# Patient Record
Sex: FEMALE | Race: WHITE | NOT HISPANIC OR LATINO | Employment: FULL TIME | URBAN - METROPOLITAN AREA
[De-identification: names, ages, dates, MRNs, and addresses within clinical notes are randomized per-mention and may not be internally consistent; named-entity substitution may affect disease eponyms.]

---

## 2017-03-02 ENCOUNTER — TELEPHONE (OUTPATIENT)
Dept: NEUROSURGERY | Facility: CLINIC | Age: 52
End: 2017-03-02

## 2017-03-17 ENCOUNTER — TELEPHONE (OUTPATIENT)
Dept: NEUROSURGERY | Facility: CLINIC | Age: 52
End: 2017-03-17

## 2017-03-21 ENCOUNTER — OFFICE VISIT (OUTPATIENT)
Dept: NEUROSURGERY | Facility: CLINIC | Age: 52
End: 2017-03-21
Payer: COMMERCIAL

## 2017-03-21 ENCOUNTER — TELEPHONE (OUTPATIENT)
Dept: NEUROSURGERY | Facility: CLINIC | Age: 52
End: 2017-03-21

## 2017-03-21 ENCOUNTER — LAB VISIT (OUTPATIENT)
Dept: LAB | Facility: HOSPITAL | Age: 52
End: 2017-03-21
Attending: NEUROLOGICAL SURGERY
Payer: COMMERCIAL

## 2017-03-21 VITALS
HEIGHT: 63 IN | WEIGHT: 158 LBS | HEART RATE: 105 BPM | BODY MASS INDEX: 28 KG/M2 | DIASTOLIC BLOOD PRESSURE: 91 MMHG | SYSTOLIC BLOOD PRESSURE: 150 MMHG

## 2017-03-21 DIAGNOSIS — G93.89 BRAIN MASS: Primary | ICD-10-CM

## 2017-03-21 DIAGNOSIS — G93.89 BRAIN MASS: ICD-10-CM

## 2017-03-21 DIAGNOSIS — D32.9 MENINGIOMA: Primary | ICD-10-CM

## 2017-03-21 LAB
ALBUMIN SERPL BCP-MCNC: 3.7 G/DL
ALP SERPL-CCNC: 86 U/L
ALT SERPL W/O P-5'-P-CCNC: 11 U/L
ANION GAP SERPL CALC-SCNC: 9 MMOL/L
AST SERPL-CCNC: 18 U/L
BILIRUB SERPL-MCNC: 0.2 MG/DL
BUN SERPL-MCNC: 7 MG/DL
CALCIUM SERPL-MCNC: 9.2 MG/DL
CHLORIDE SERPL-SCNC: 101 MMOL/L
CO2 SERPL-SCNC: 30 MMOL/L
CORTIS SERPL-MCNC: 3.4 UG/DL
CREAT SERPL-MCNC: 0.8 MG/DL
EST. GFR  (AFRICAN AMERICAN): >60 ML/MIN/1.73 M^2
EST. GFR  (NON AFRICAN AMERICAN): >60 ML/MIN/1.73 M^2
FSH SERPL-ACNC: 6.5 MIU/ML
GLUCOSE SERPL-MCNC: 102 MG/DL
LH SERPL-ACNC: 4 MIU/ML
POTASSIUM SERPL-SCNC: 3.9 MMOL/L
PROLACTIN SERPL IA-MCNC: 9.3 NG/ML
PROT SERPL-MCNC: 6.7 G/DL
SODIUM SERPL-SCNC: 140 MMOL/L
T4 FREE SERPL-MCNC: 0.94 NG/DL
TSH SERPL DL<=0.005 MIU/L-ACNC: 0.6 UIU/ML

## 2017-03-21 PROCEDURE — 82533 TOTAL CORTISOL: CPT

## 2017-03-21 PROCEDURE — 83002 ASSAY OF GONADOTROPIN (LH): CPT

## 2017-03-21 PROCEDURE — 99204 OFFICE O/P NEW MOD 45 MIN: CPT | Mod: S$GLB,,, | Performed by: NEUROLOGICAL SURGERY

## 2017-03-21 PROCEDURE — 83001 ASSAY OF GONADOTROPIN (FSH): CPT

## 2017-03-21 PROCEDURE — 80053 COMPREHEN METABOLIC PANEL: CPT

## 2017-03-21 PROCEDURE — 1160F RVW MEDS BY RX/DR IN RCRD: CPT | Mod: S$GLB,,, | Performed by: NEUROLOGICAL SURGERY

## 2017-03-21 PROCEDURE — 84439 ASSAY OF FREE THYROXINE: CPT

## 2017-03-21 PROCEDURE — 99999 PR PBB SHADOW E&M-EST. PATIENT-LVL III: CPT | Mod: PBBFAC,,, | Performed by: NEUROLOGICAL SURGERY

## 2017-03-21 PROCEDURE — 84443 ASSAY THYROID STIM HORMONE: CPT

## 2017-03-21 PROCEDURE — 82672 ASSAY OF ESTROGEN: CPT

## 2017-03-21 PROCEDURE — 84146 ASSAY OF PROLACTIN: CPT

## 2017-03-21 PROCEDURE — 84305 ASSAY OF SOMATOMEDIN: CPT

## 2017-03-21 PROCEDURE — 30000890 MAYO MISCELLANEOUS TEST (REFLEX)

## 2017-03-21 PROCEDURE — 83003 ASSAY GROWTH HORMONE (HGH): CPT

## 2017-03-21 RX ORDER — PROMETHAZINE HYDROCHLORIDE 50 MG/1
50 TABLET ORAL EVERY 4 HOURS
COMMUNITY

## 2017-03-21 RX ORDER — LISDEXAMFETAMINE DIMESYLATE 70 MG/1
70 CAPSULE ORAL EVERY MORNING
COMMUNITY
End: 2021-01-12

## 2017-03-21 RX ORDER — HYDROCHLOROTHIAZIDE 25 MG/1
50 TABLET ORAL DAILY
COMMUNITY

## 2017-03-21 RX ORDER — MORPHINE SULFATE 100 MG/1
30 TABLET, FILM COATED, EXTENDED RELEASE ORAL 2 TIMES DAILY
COMMUNITY
End: 2018-02-27

## 2017-03-21 RX ORDER — ORPHENADRINE CITRATE 100 MG/1
100 TABLET, EXTENDED RELEASE ORAL 2 TIMES DAILY
COMMUNITY

## 2017-03-21 RX ORDER — TIZANIDINE 4 MG/1
4 TABLET ORAL
COMMUNITY
End: 2017-08-30 | Stop reason: SDUPTHER

## 2017-03-21 RX ORDER — IBUPROFEN 800 MG/1
800 TABLET ORAL 2 TIMES DAILY PRN
COMMUNITY
End: 2018-11-29 | Stop reason: SDUPTHER

## 2017-03-21 RX ORDER — METOPROLOL TARTRATE 25 MG/1
100 TABLET, FILM COATED ORAL 2 TIMES DAILY
COMMUNITY

## 2017-03-21 RX ORDER — BUTALBITAL, ASPIRIN, AND CAFFEINE 325; 50; 40 MG/1; MG/1; MG/1
1 CAPSULE ORAL
COMMUNITY
End: 2017-08-30 | Stop reason: SDUPTHER

## 2017-03-21 RX ORDER — QUETIAPINE FUMARATE 100 MG/1
200 TABLET, FILM COATED ORAL NIGHTLY
COMMUNITY

## 2017-03-21 NOTE — MR AVS SNAPSHOT
[facility]  [address] [address]  Phone: [phone]                  Patty Owens   3/21/2017 10:00 AM   Office Visit    Description:  Female : 1965   Provider:  Castillo Roberts MD   Department:  [facility]           Reason for Visit     Consult                To Do List           Future Appointments        Provider Department Dept Phone    3/21/2017 11:30 AM LAB, HEMONC SAME DAY [facility] [phone]    2017 9:45 AM NOM MRI4 [facility] [phone]    2017 10:45 AM Castillo Roberts MD [facility] [phone]      Goals (5 Years of Data)     None      [facility]     [facility] On [facility] Nurse Care Line -  Assistance  Registered nurses in the [facility] Center provide clinical advisement, health education, appointment booking, and other advisory services.  Call for this free service at [phone].             Medications           Message regarding Medications     Verify the changes and/or additions to your medication regime listed below are the same as discussed with your clinician today.  If any of these changes or additions are incorrect, please notify your healthcare provider.             Verify that the below list of medications is an accurate representation of the medications you are currently taking.  If none reported, the list may be blank. If incorrect, please contact your healthcare provider. Carry this list with you in case of emergency.           Current Medications     butalbital-aspirin-caffeine -40 mg (FIORINAL) -40 mg Cap Take 1 capsule by mouth.    hydrochlorothiazide (HYDRODIURIL) 25 MG tablet Take 50 mg by mouth once daily.    ibuprofen (ADVIL,MOTRIN) 800 MG tablet Take 800 mg by mouth 2 (two) times daily as needed for Pain.    lisdexamfetamine (VYVANSE) 70 MG capsule Take 70 mg by mouth every morning.    metoprolol  "tartrate (LOPRESSOR) 25 MG tablet Take 100 mg by mouth 2 (two) times daily.    morphine (MS CONTIN) 100 MG 12 hr tablet Take 30 mg by mouth 2 (two) times daily.    orphenadrine (NORFLEX) 100 mg tablet Take 100 mg by mouth 2 (two) times daily.    promethazine (PHENERGAN) 50 MG tablet Take 50 mg by mouth every 4 (four) hours.    quetiapine (SEROQUEL) 100 MG Tab Take 200 mg by mouth every evening.    tizanidine (ZANAFLEX) 4 MG tablet Take 4 mg by mouth.           Clinical Reference Information           Your Vitals Were     BP Pulse Height Weight BMI    150/91 105 5' 3" (1.6 m) 71.7 kg (158 lb) 27.99 kg/m2      Blood Pressure          Most Recent Value    BP  (!)  150/91      Allergies as of 3/21/2017     Darvocet A500 [Propoxyphene N-acetaminophen]    Tigan [Trimethobenzamide]    Darvon [Propoxyphene]    Topamax [Topiramate]      Immunizations Administered on Date of Encounter - 3/21/2017     None      [facility] Sign-Up     Activating your [facility] account is as easy as 1-2-3!     1) Visit [email or web address], select Sign Up Now, enter this activation code and your date of birth, then select Next.  [email or web address]  Expires: 5/5/2017 10:07 AM      2) Create a username and password to use when you visit [facility] in the future and select a security question in case you lose your password and select Next.    3) Enter your e-mail address and click Sign Up!    Additional Information  If you have questions, please e-mail [email or web address] or call [phone] to talk to our [facility] staff. Remember, [facility] is NOT to be used for urgent needs. For medical emergencies, dial 911.         Instructions    I have reviewed the MRI of the brain with the patient, which shows a residual/recurrent sphenoid wing meningioma on the left side covering the cavernous sinus that measures 2.13 cm x 0.8 cm. There is enhancement that is intraorbital near the lateral rectus  There is also evidence of enhancement in the area of the sphenoid " sinus, which is concerning for chronic sinusitis. I have explained the indications of sphenoid wing meningiomas and the ways they can cause worsening of her vision.     I will order pituitary labs on the patient today. I would like a copy of all of her previous imaging of the brain. I would also like a copy of her records from [place]. I will refer the patient to Dr. Hewitt of Neuro-opthalmology for a full evaluation with [facility]. I will order a repeat MRI of the brain. I will schedule her follow up in 2-3 weeks to discuss a treatment plan.       Language Assistance Services     ATTENTION: Language assistance services are available, free of charge. Please call [phone].      ATENCIÓN: Si benla lisa, tiene a xiao disposición servicios gratuitos de asistencia lingüística. Llame al [phone].     CHÚ Ý: N?u b?n nói Ti?ng Vi?t, có các d?ch v? h? tr? ngôn ng? mi?n phí dành cho b?n. G?i s? [phone].         [facility] complies with applicable Federal civil rights laws and does not discriminate on the basis of race, color, national origin, age, disability, or sex.

## 2017-03-21 NOTE — PROGRESS NOTES
"Subjective:    I, Chandni Saldivar, am scribing for, and in the presence of, Dr. Castillo Roberts.     Patient ID: Patty Owens is a 51 y.o. female.    Chief Complaint: Consult    HPI This is a 51-year-old female, referred by Dr. Baptiste, who presents today for consultation for meningioma. In 2013, she began to experience visual disturbance in her left eye. She was evaluated by an ophthalmologist at the time and the sphenoid wing meningioma was discovered on a MRI. She underwent resection of this tumor performed by Dr. Ahmadi. She states a pathology report was performed and she was told it was benign, but unsure the exact pathology. There was residual tumor at the time of resection. A month after the surgery, she started to experience headaches and was diagnosed with a CSF leak. She has been followed by two ENT physicians and Dr. Ahmadi regarding this. She had another MRI performed in 2013, which showed a second tumor near the optic nerve. In November 2013, the pt underwent Cyberknife radiosurgery by Dr. Cramer..She had normal scans in 2014. She had follow up scans in 2015 and 2016 and was told they were normal; however, the reports indicated otherwise. The pt reports significant anxiety/nervousness surrounding the tumors and the follow up.     She describes persistent, left temporal headaches with extreme sensitivity, numbness, and intermittent "shooting" pain in this region.    Intermittently when bending over she experiences postnasal drip/rhinorrhea and believes she has intermittent CSF leaks. This last happened approximately one month ago. Associated symptoms include dizziness and nausea. She has tried to collect the postnasal drip/rhinorrhea without success.     She also endorses worsening of her vision over this time period. 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 She also experiences intermittent pain in the left eye and eye drooping. She has not tried to discuss this " "with Dr. Ahmadi.    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 She reports gaining approximately 30 pounds and suffering with extreme fatigue over the last year. She has not had her pituitary labs checked. She takes Vyvanse to help focus at work and has intermittent difficulty with word finding. She denies depression and states that she is just "tired of it all."    Review of Systems   Constitutional: Positive for fatigue and unexpected weight change. Negative for activity change and fever.   HENT: Positive for postnasal drip and rhinorrhea. Negative for facial swelling.    Eyes: Positive for pain and visual disturbance.   Respiratory: Negative.    Cardiovascular: Negative.    Gastrointestinal: Positive for nausea. Negative for diarrhea and vomiting.   Genitourinary: Negative.    Musculoskeletal: Negative for back pain, joint swelling and myalgias.   Neurological: Positive for dizziness, speech difficulty and headaches. Negative for seizures, weakness and numbness.   Psychiatric/Behavioral: The patient is nervous/anxious.        Past Medical History:   Diagnosis Date    Allergy     Anxiety     Cataract     Fibromyalgia     Hypertension     Meningioma     Migraines      Objective:     BP (!) 150/91  Pulse 105  Ht 5' 3" (1.6 m)  Wt 71.7 kg (158 lb)  BMI 27.99 kg/m2  Physical Exam   Constitutional: She is oriented to person, place, and time. She appears well-developed and well-nourished.   HENT:   Head: Normocephalic and atraumatic.   Eyes: Pupils are equal, round, and reactive to light.   Inferior field cut and bitemporal field cut   Neck: Neck supple.   Pulmonary/Chest: Effort normal.   Musculoskeletal: She exhibits no edema.   Neurological: She is alert and oriented to person, place, and time. No cranial nerve deficit. She displays a negative Romberg sign. GCS eye subscore is 4. GCS verbal subscore is 5. GCS motor subscore is 6.   Skin: Skin is warm, dry and intact. "   Psychiatric: She has a normal mood and affect.         Imaging:  MRI of the brain, dated 10/31/2016 (outside facility), shows a residual/recurrent sphenoid wing meningioma on the left side covering the cavernous sinus that measures 2.13 cm x 0.8 cm. There is enhancement that is intraorbital near the lateral rectus  There is also evidence of enhancement in the area of the sphenoid sinus, which is concerning for chronic sinusitis.       I have personally reviewed the images with the pt.      I, Dr. Castillo Roberts, personally performed the services described in this documentation as scribed by Chandni Saldivar in my presence, and it is both accurate and complete.  Assessment:       Meningioma.    Plan:   I have reviewed the MRI of the brain with the patient, which shows a residual/recurrent sphenoid wing meningioma on the left side covering the cavernous sinus that measures 2.13 cm x 0.8 cm. There is enhancement that is intraorbital near the lateral rectus  There is also evidence of enhancement in the area of the sphenoid sinus, which is concerning for chronic sinusitis. I have explained the indications of sphenoid wing meningiomas and the ways they can cause worsening of her vision.     I will order pituitary labs on the patient today. I would like a copy of all of her previous imaging of her brain. I would also like a copy of her records from [place]. I will refer the patient to Dr. Hewitt of Neuro-opthalmology for a full evaluation with [facility]. I will order a repeat MRI of the brain. I will schedule her follow up in 2-3 weeks to discuss a treatment plan.

## 2017-03-21 NOTE — PATIENT INSTRUCTIONS
I have reviewed the MRI of the brain with the patient, which shows a residual/recurrent sphenoid wing meningioma on the left side covering the cavernous sinus that measures 2.13 cm x 0.8 cm. There is enhancement that is intraorbital near the lateral rectus  There is also evidence of enhancement in the area of the sphenoid sinus, which is concerning for chronic sinusitis. I have explained the indications of sphenoid wing meningiomas and the ways they can cause worsening of her vision.     I will order pituitary labs on the patient today. I would like a copy of all of her previous imaging of the brain. I would also like a copy of her records from [place]. I will refer the patient to Dr. Hewitt of Neuro-opthalmology for a full evaluation with [facility]. I will order a repeat MRI of the brain. I will schedule her follow up in 2-3 weeks to discuss a treatment plan.

## 2017-03-21 NOTE — LETTER
March 22, 2017      eKra Baptiste MD  [address]  [facility] [address]           [facility]  [address]  [place] [address]  Phone: [phone]          Patient: Patty Owens   MR Number: [id number]   YOB: 1965   Date of Visit: 3/21/2017       Dear Dr. Kera Baptiste:    Thank you for referring Patty Owens to me for evaluation. Attached you will find relevant portions of my assessment and plan of care.    If you have questions, please do not hesitate to call me. I look forward to following Patty Owens along with you.    Sincerely,    Castillo Roberts MD    Enclosure  CC:  No Recipients    If you would like to receive this communication electronically, please contact [email or web address] or [phone] to request more information on [employer] access.    For providers and/or their staff who would like to refer a patient to [facility], please contact us through our one-stop-shop provider referral line, [facility] , at [phone].    If you feel you have received this communication in error or would no longer like to receive these types of communications, please e-mail [email or web address]

## 2017-03-23 LAB — ESTROGEN SERPL-MCNC: 381 PG/ML

## 2017-03-24 LAB — GH SERPL-MCNC: <0.1 NG/ML

## 2017-03-25 LAB — MAYO MISCELLANEOUS RESULT (REF): NORMAL

## 2017-03-28 ENCOUNTER — CLINICAL SUPPORT (OUTPATIENT)
Dept: OPHTHALMOLOGY | Facility: CLINIC | Age: 52
End: 2017-03-28
Payer: COMMERCIAL

## 2017-03-28 ENCOUNTER — INITIAL CONSULT (OUTPATIENT)
Dept: OPHTHALMOLOGY | Facility: CLINIC | Age: 52
End: 2017-03-28
Payer: COMMERCIAL

## 2017-03-28 DIAGNOSIS — H53.413 CENTRAL SCOTOMA, BOTH EYES: ICD-10-CM

## 2017-03-28 DIAGNOSIS — D32.0 INTRACRANIAL MENINGIOMA: ICD-10-CM

## 2017-03-28 DIAGNOSIS — H47.292: ICD-10-CM

## 2017-03-28 PROCEDURE — 92083 EXTENDED VISUAL FIELD XM: CPT | Mod: S$GLB,,, | Performed by: OPHTHALMOLOGY

## 2017-03-28 PROCEDURE — 92004 COMPRE OPH EXAM NEW PT 1/>: CPT | Mod: S$GLB,,, | Performed by: OPHTHALMOLOGY

## 2017-03-28 PROCEDURE — 99999 PR PBB SHADOW E&M-EST. PATIENT-LVL II: CPT | Mod: PBBFAC,,, | Performed by: OPHTHALMOLOGY

## 2017-03-28 PROCEDURE — 92133 CPTRZD OPH DX IMG PST SGM ON: CPT | Mod: S$GLB,,, | Performed by: OPHTHALMOLOGY

## 2017-03-28 NOTE — PROGRESS NOTES
HPI     Concerns About Ocular Health    Additional comments:  Meningioma            Comments   Referred by  Evaluation  Dx w/Meningioma x 04/2013(removed). Second 2013(radiation), and another   last year 2016.  Pt states OU see floaters and partial of her vision gets blurry. OU   feeling of pressure and focusing.  Headaches all the times mainly the left side head(shooting and stabbing).   7 on pain scale.  Night driving and peripheral vision getting difficult.  Review HVF    I have personally interviewed the patient, reviewed the history and   examined the patient and agree with the technician's exam.           Last edited by Marty Hewitt MD on 3/28/2017  9:19 AM. (History)        ROS     Positive for: Neurological, Musculoskeletal, Cardiovascular, Eyes    Negative for: Constitutional, Gastrointestinal, Skin, Genitourinary,   HENT, Endocrine, Respiratory, Psychiatric, Allergic/Imm, Heme/Lymph    Last edited by Marty Hewitt MD on 3/28/2017  9:19 AM. (History)        Assessment /Plan     For exam results, see Encounter Report.    Intracranial meningioma  -     [facility] Visual Field - OU - Extended - Both Eyes  -     Posterior Segment OCT Optic Nerve- Both eyes    Central scotoma, both eyes  -     [facility] Visual Field - OU - Extended - Both Eyes  -     Posterior Segment OCT Optic Nerve- Both eyes    Secondary optic atrophy of left eye      Ms. Owens's left optic nerve demonstrated loss of nerve tissue temporally that could be the result either of direct compression from her meningioma or to radiation necrosis. Her right optic nerve appears healthy. I will repeat her exam and visual fields in six months.

## 2017-03-28 NOTE — MR AVS SNAPSHOT
[facility]  [address]  [facility] [address]  Phone: [phone]  Fax: [phone]                  Patty Owens   3/28/2017 9:00 AM   Initial consult    Description:  Female : 1965   Provider:  Marty Hewitt MD   Department:  [facility]           Reason for Visit     Concerns About Ocular Health           Diagnoses this Visit        Comments    Intracranial meningioma         Central scotoma, both eyes         Secondary optic atrophy of left eye                To Do List           Future Appointments        Provider Department Dept Phone    2017 9:45 AM [facility] MRI4 [facility] [phone]    2017 10:45 AM Castillo Roberts MD [facility] [place] [phone]      Goals (5 Years of Data)     None      Follow-Up and Disposition     Return in about 6 months (around 2017) for Exam and HVF.      [facility]     [facility] Nurse Care Line -  Assistance  Registered nurses in the [facility] provide clinical advisement, health education, appointment booking, and other advisory services.  Call for this free service at [phone].             Medications           Message regarding Medications     Verify the changes and/or additions to your medication regime listed below are the same as discussed with your clinician today.  If any of these changes or additions are incorrect, please notify your healthcare provider.             Verify that the below list of medications is an accurate representation of the medications you are currently taking.  If none reported, the list may be blank. If incorrect, please contact your healthcare provider. Carry this list with you in case of emergency.           Current Medications     butalbital-aspirin-caffeine -40 mg (FIORINAL) -40 mg Cap Take 1 capsule by mouth.    hydrochlorothiazide (HYDRODIURIL) 25 MG tablet Take 50 mg by mouth once daily.     ibuprofen (ADVIL,MOTRIN) 800 MG tablet Take 800 mg by mouth 2 (two) times daily as needed for Pain.    lisdexamfetamine (VYVANSE) 70 MG capsule Take 70 mg by mouth every morning.    metoprolol tartrate (LOPRESSOR) 25 MG tablet Take 100 mg by mouth 2 (two) times daily.    morphine (MS CONTIN) 100 MG 12 hr tablet Take 30 mg by mouth 2 (two) times daily.    orphenadrine (NORFLEX) 100 mg tablet Take 100 mg by mouth 2 (two) times daily.    promethazine (PHENERGAN) 50 MG tablet Take 50 mg by mouth every 4 (four) hours.    quetiapine (SEROQUEL) 100 MG Tab Take 200 mg by mouth every evening.    tizanidine (ZANAFLEX) 4 MG tablet Take 4 mg by mouth.           Clinical Reference Information           Allergies as of 3/28/2017     Darvocet A500 [Propoxyphene N-acetaminophen]    Tigan [Trimethobenzamide]    Darvon [Propoxyphene]    Topamax [Topiramate]      Immunizations Administered on Date of Encounter - 3/28/2017     None      Orders Placed During Today's Visit      Normal Orders This Visit    [facility] Visual Field - OU - Extended - Both Eyes     Posterior Segment OCT Optic Nerve- Both eyes       [facility] Sign-Up     Activating your [facility] account is as easy as 1-2-3!     1) Visit [email or web address], select Sign Up Now, enter this activation code and your date of birth, then select Next.  [email or web address]  Expires: 5/5/2017 10:07 AM      2) Create a username and password to use when you visit [facility] in the future and select a security question in case you lose your password and select Next.    3) Enter your e-mail address and click Sign Up!    Additional Information  If you have questions, please e-mail [email or web address] or call [phone] to talk to our [facility] staff. Remember, [facility] is NOT to be used for urgent needs. For medical emergencies, dial 911.         Instructions    Repeat exam and visual field testing in six months or sooner if requested.       Language Assistance Services     ATTENTION: Language  assistance services are available, free of charge. Please call [phone].      ATENCIÓN: Si habla lisa, tiene a xiao disposición servicios gratuitos de asistencia lingüística. Llame al [phone].     CHÚ Ý: N?u b?n nói Ti?ng Vi?t, có các d?ch v? h? tr? ngôn ng? mi?n phí dành cho b?n. G?i s? [phone].         [facility] complies with applicable Federal civil rights laws and does not discriminate on the basis of race, color, national origin, age, disability, or sex.

## 2017-03-28 NOTE — LETTER
[facility]  [address]  [facility] [address]  Phone: [phone]  Fax: [phone]   March 28, 2017    Castillo Roberts MD  [address]  [facility] [address]    Patient: Patty Owens   MR Number: [id number]   YOB: 1965   Date of Visit: 3/28/2017       Dear Dr. Roberts:    Thank you for referring Patty Owens to me for evaluation. Here is my assessment and plan of care:    Assessment:   /Plan     For exam results, see Encounter Report.    Intracranial meningioma  -     [facility] Visual Field - OU - Extended - Both Eyes  -     Posterior Segment OCT Optic Nerve- Both eyes    Central scotoma, both eyes  -     [facility] Visual Field - OU - Extended - Both Eyes  -     Posterior Segment OCT Optic Nerve- Both eyes    Secondary optic atrophy of left eye      Ms. Owens's left optic nerve demonstrated loss of nerve tissue temporally that could be the result either of direct compression from her meningioma or to radiation necrosis. Her right optic nerve appears healthy. I will repeat her exam and visual fields in six months.          Plan:       For exam results, see Encounter Report.    Intracranial meningioma  -     [facility] Visual Field - OU - Extended - Both Eyes  -     Posterior Segment OCT Optic Nerve- Both eyes    Central scotoma, both eyes  -     [facility] Visual Field - OU - Extended - Both Eyes  -     Posterior Segment OCT Optic Nerve- Both eyes    Secondary optic atrophy of left eye      Ms. Owens's left optic nerve demonstrated loss of nerve tissue temporally that could be the result either of direct compression from her meningioma or to radiation necrosis. Her right optic nerve appears healthy. I will repeat her exam and visual fields in six months.            Below you will find my full exam findings. If you have questions, please do not hesitate to call me. I look forward to following Ms. Patty Owens along with you.    Sincerely,           Marty Hewitt,  MD       CC  Kera Baptiste MD             Base Eye Exam     Visual Acuity (Snellen - Linear)      Right Left   Dist cc 20/70 20/50   Dist ph cc 20/40-1 NI       Correction:  Glasses      Tonometry (Applanation, 9:34 AM)      Right Left   Pressure 20 19         Pupils      Dark Light React APD   Right 6 4 Brisk None   Left 6 4 Brisk None         Visual Fields     See HVF report.      Extraocular Movement      Right Left   Result Full, Ortho Full, Ortho         Neuro/Psych     Oriented x3:  Yes    Mood/Affect:  Normal      Dilation     Both eyes:  1% Mydriacyl, 0.5% Mydriacyl @ 9:35 AM            Slit Lamp and Fundus Exam     External Exam      Right Left    External Normal Normal      Slit Lamp Exam      Right Left    Lids/Lashes Normal Normal    Conjunctiva/Sclera White and quiet White and quiet    Cornea Clear Clear    Anterior Chamber Deep and quiet Deep and quiet    Iris Round and reactive Round and reactive    Lens 1+ Nuclear sclerosis, trace cortical change 1+ Nuclear sclerosis, trace cortical change    Vitreous Normal Normal      Fundus Exam      Right Left    Disc Normal 1+ temporal pallor    C/D Ratio 0.2 0.2    Macula Normal Normal    Vessels Normal Normal    Periphery Small old chorioretinal scar inferior to optic disc and along vascular arcade. Normal

## 2017-08-29 ENCOUNTER — HOSPITAL ENCOUNTER (EMERGENCY)
Facility: HOSPITAL | Age: 52
Discharge: HOME | End: 2017-08-29
Attending: EMERGENCY MEDICINE
Payer: COMMERCIAL

## 2017-08-29 ENCOUNTER — TELEPHONE (OUTPATIENT)
Dept: NEUROSURGERY | Facility: CLINIC | Age: 52
End: 2017-08-29

## 2017-08-29 VITALS
BODY MASS INDEX: 26.58 KG/M2 | TEMPERATURE: 98 F | HEIGHT: 63 IN | OXYGEN SATURATION: 100 % | DIASTOLIC BLOOD PRESSURE: 80 MMHG | WEIGHT: 150 LBS | RESPIRATION RATE: 18 BRPM | SYSTOLIC BLOOD PRESSURE: 155 MMHG | HEART RATE: 78 BPM

## 2017-08-29 DIAGNOSIS — H57.12 ACUTE LEFT EYE PAIN: Primary | ICD-10-CM

## 2017-08-29 LAB
ALBUMIN SERPL BCP-MCNC: 3.7 G/DL
ALP SERPL-CCNC: 107 U/L
ALT SERPL W/O P-5'-P-CCNC: 12 U/L
ANION GAP SERPL CALC-SCNC: 8 MMOL/L
AST SERPL-CCNC: 17 U/L
B-HCG UR QL: NEGATIVE
BASOPHILS # BLD AUTO: 0.03 K/UL
BASOPHILS NFR BLD: 0.6 %
BILIRUB SERPL-MCNC: 0.2 MG/DL
BUN SERPL-MCNC: 4 MG/DL
CALCIUM SERPL-MCNC: 9.1 MG/DL
CHLORIDE SERPL-SCNC: 100 MMOL/L
CO2 SERPL-SCNC: 30 MMOL/L
CREAT SERPL-MCNC: 0.8 MG/DL
CRP SERPL-MCNC: 2.2 MG/L
CTP QC/QA: YES
DIFFERENTIAL METHOD: ABNORMAL
EOSINOPHIL # BLD AUTO: 0.1 K/UL
EOSINOPHIL NFR BLD: 2.9 %
ERYTHROCYTE [DISTWIDTH] IN BLOOD BY AUTOMATED COUNT: 12.1 %
ERYTHROCYTE [SEDIMENTATION RATE] IN BLOOD BY WESTERGREN METHOD: 5 MM/HR
EST. GFR  (AFRICAN AMERICAN): >60 ML/MIN/1.73 M^2
EST. GFR  (NON AFRICAN AMERICAN): >60 ML/MIN/1.73 M^2
GLUCOSE SERPL-MCNC: 96 MG/DL
HCT VFR BLD AUTO: 38.2 %
HGB BLD-MCNC: 13.2 G/DL
INR PPP: 0.9
LYMPHOCYTES # BLD AUTO: 1.5 K/UL
LYMPHOCYTES NFR BLD: 31.3 %
MCH RBC QN AUTO: 31.2 PG
MCHC RBC AUTO-ENTMCNC: 34.6 G/DL
MCV RBC AUTO: 90 FL
MONOCYTES # BLD AUTO: 0.4 K/UL
MONOCYTES NFR BLD: 9 %
NEUTROPHILS # BLD AUTO: 2.7 K/UL
NEUTROPHILS NFR BLD: 56 %
PLATELET # BLD AUTO: 246 K/UL
PMV BLD AUTO: 10.1 FL
POTASSIUM SERPL-SCNC: 3.2 MMOL/L
PROT SERPL-MCNC: 6.9 G/DL
PROTHROMBIN TIME: 10.2 SEC
RBC # BLD AUTO: 4.23 M/UL
SODIUM SERPL-SCNC: 138 MMOL/L
WBC # BLD AUTO: 4.79 K/UL

## 2017-08-29 PROCEDURE — 63600175 PHARM REV CODE 636 W HCPCS: Performed by: EMERGENCY MEDICINE

## 2017-08-29 PROCEDURE — 99284 EMERGENCY DEPT VISIT MOD MDM: CPT | Mod: ,,, | Performed by: EMERGENCY MEDICINE

## 2017-08-29 PROCEDURE — 99284 EMERGENCY DEPT VISIT MOD MDM: CPT | Mod: ,,, | Performed by: NEUROLOGICAL SURGERY

## 2017-08-29 PROCEDURE — 80053 COMPREHEN METABOLIC PANEL: CPT

## 2017-08-29 PROCEDURE — 25000003 PHARM REV CODE 250: Performed by: EMERGENCY MEDICINE

## 2017-08-29 PROCEDURE — 86140 C-REACTIVE PROTEIN: CPT

## 2017-08-29 PROCEDURE — 85651 RBC SED RATE NONAUTOMATED: CPT

## 2017-08-29 PROCEDURE — 85025 COMPLETE CBC W/AUTO DIFF WBC: CPT

## 2017-08-29 PROCEDURE — 99284 EMERGENCY DEPT VISIT MOD MDM: CPT

## 2017-08-29 PROCEDURE — A9585 GADOBUTROL INJECTION: HCPCS | Performed by: EMERGENCY MEDICINE

## 2017-08-29 PROCEDURE — 25500020 PHARM REV CODE 255: Performed by: EMERGENCY MEDICINE

## 2017-08-29 PROCEDURE — 81025 URINE PREGNANCY TEST: CPT | Performed by: EMERGENCY MEDICINE

## 2017-08-29 PROCEDURE — 85610 PROTHROMBIN TIME: CPT

## 2017-08-29 RX ORDER — DEXAMETHASONE 4 MG/1
12 TABLET ORAL
Status: COMPLETED | OUTPATIENT
Start: 2017-08-29 | End: 2017-08-29

## 2017-08-29 RX ORDER — DEXAMETHASONE 4 MG/1
4 TABLET ORAL EVERY 8 HOURS
Qty: 6 TABLET | Refills: 0 | Status: SHIPPED | OUTPATIENT
Start: 2017-08-30 | End: 2017-09-09

## 2017-08-29 RX ORDER — ONDANSETRON 4 MG/1
4 TABLET, ORALLY DISINTEGRATING ORAL
Status: COMPLETED | OUTPATIENT
Start: 2017-08-29 | End: 2017-08-29

## 2017-08-29 RX ORDER — POTASSIUM CHLORIDE 20 MEQ/15ML
60 SOLUTION ORAL ONCE
Status: COMPLETED | OUTPATIENT
Start: 2017-08-29 | End: 2017-08-29

## 2017-08-29 RX ORDER — CODEINE SULFATE 30 MG/1
30 TABLET ORAL EVERY 4 HOURS PRN
Status: DISCONTINUED | OUTPATIENT
Start: 2017-08-30 | End: 2017-08-30 | Stop reason: HOSPADM

## 2017-08-29 RX ORDER — POTASSIUM CHLORIDE 20 MEQ/15ML
60 SOLUTION ORAL DAILY
Status: DISCONTINUED | OUTPATIENT
Start: 2017-08-30 | End: 2017-08-29

## 2017-08-29 RX ORDER — PROPARACAINE HYDROCHLORIDE 5 MG/ML
2 SOLUTION/ DROPS OPHTHALMIC
Status: DISCONTINUED | OUTPATIENT
Start: 2017-08-29 | End: 2017-08-30 | Stop reason: HOSPADM

## 2017-08-29 RX ORDER — GADOBUTROL 604.72 MG/ML
7 INJECTION INTRAVENOUS
Status: COMPLETED | OUTPATIENT
Start: 2017-08-29 | End: 2017-08-29

## 2017-08-29 RX ORDER — ONDANSETRON 4 MG/1
4 TABLET, FILM COATED ORAL EVERY 6 HOURS
Qty: 12 TABLET | Refills: 4 | Status: SHIPPED | OUTPATIENT
Start: 2017-08-29 | End: 2021-01-12 | Stop reason: DRUGHIGH

## 2017-08-29 RX ORDER — BUTALBITAL, ACETAMINOPHEN AND CAFFEINE 50; 325; 40 MG/1; MG/1; MG/1
1 TABLET ORAL
Status: COMPLETED | OUTPATIENT
Start: 2017-08-29 | End: 2017-08-29

## 2017-08-29 RX ADMIN — BUTALBITAL, ACETAMINOPHEN, AND CAFFEINE 1 TABLET: 50; 325; 40 TABLET ORAL at 11:08

## 2017-08-29 RX ADMIN — GADOBUTROL 7 ML: 604.72 INJECTION INTRAVENOUS at 07:08

## 2017-08-29 RX ADMIN — DEXAMETHASONE 12 MG: 4 TABLET ORAL at 11:08

## 2017-08-29 RX ADMIN — POTASSIUM CHLORIDE 60 MEQ: 20 SOLUTION ORAL at 11:08

## 2017-08-29 RX ADMIN — ONDANSETRON 4 MG: 4 TABLET, ORALLY DISINTEGRATING ORAL at 11:08

## 2017-08-29 NOTE — ED NOTES
Urine cup placed at bedside. Pt unable to give urine for pregnancy test at this time. Told pt importance of urine sample. Pt stated she will try in a few minutes.

## 2017-08-29 NOTE — ED TRIAGE NOTES
Pt reports having extreme pain to the left eye since last night. Pt reports blurred vision to the eye and headaches.  Pt has hx of turmors and two currently. On tumor reported to the optic nerve. Pt sees Dr. Roberts

## 2017-08-29 NOTE — ED NOTES
Visual acuity screen: pt did not have driving glasses with her  Both eyes: 20/200  Right: 20/70  Left: 20/100

## 2017-08-29 NOTE — ED NOTES
Two patient identifiers checked and confirmed.    APPEARANCE: Resting comfortably in no acute distress. Patient has clean hair, skin and nails. Clothing is appropriate and properly fastened.  NEURO: Awake, alert, appropriate for age, and cooperative with a calm affect; pupils equal and round. Oriented x4. Pt has headache and photosensitivity.  HEENT: Head symmetrical. Bilateral eyes without redness or drainage. Pt reports extreme pain (7/10 currently) in the left eye since last night. Pt reports blurred vision to left eye only.  CARDIAC: Regular rate and rhythm.  RESPIRATORY: Airway is open and patent. Respirations are spontaneous on room air. Normal respiratory effort and rate noted.  GI/: Abdomen soft and non-distended. Patient is reported to void and stool appropriately for age. Pt reports nausea but no vomiting.  NEUROVASCULAR: All extremities are warm and pink with +2 pulses and capillary refill less than 3 seconds.  MUSCULOSKELETAL: Moves all extremities well; no obvious deformities noted.  SKIN: Warm and dry, adequate turgor, mucus membranes moist and pink.

## 2017-08-29 NOTE — ED PROVIDER NOTES
"Encounter Date: 8/29/2017    SCRIBE #1 NOTE: I, Stephen Morgan, am scribing for, and in the presence of,  Dr. Ballesteros. I have scribed the following portions of the note - the Resident attestation.       History     Chief Complaint   Patient presents with    Eye Pain     L eye pain started at 2 am, woke me up, denies injury, have tumor near optic nerve, told by dr roberts to come to er     The history is provided by the patient and medical records.     52 yo woman with h/o left sphenoid meningioma and migraines presents with new left eye pain, woke her up from sleep, severe, partially relieved by Fioricet and Aleve, still present, worse than prior episodes of left eye and periorbital pain, associated with photophobia, worsening vision, and nausea, but no tearing, redness, or fever. No family or personal h/o glaucoma. Sees neurosurgery Dr. Roberts, last MRI at outside facility 10/2016, overread by Dr. Roberts as "residual/recurrent sphenoid wing meningioma on the left side covering the cavernous sinus that measures 2.13 cm x 0.8 cm. There is enhancement that is intraorbital near the lateral rectus  There is also evidence of enhancement in the area of the sphenoid sinus, which is concerning for chronic sinusitis."    Review of patient's allergies indicates:   Allergen Reactions    Darvocet a500 [propoxyphene n-acetaminophen] Other (See Comments)     GI      Tigan [trimethobenzamide] Anaphylaxis     Dystonic reaction    Darvon [propoxyphene]     Topamax [topiramate] Rash     Past Medical History:   Diagnosis Date    Allergy     Anxiety     Cataract     Fibromyalgia     Hypertension     Meningioma     Migraines      Past Surgical History:   Procedure Laterality Date    CRANIOTOMY  05/08/2013    cyber knife  11/2013    TONSILLECTOMY  1984     History reviewed. No pertinent family history.  Social History   Substance Use Topics    Smoking status: Former Smoker    Smokeless tobacco: Never Used    Alcohol use No "     Review of Systems   Constitutional: Negative for diaphoresis and fever.   HENT: Negative for drooling and facial swelling.    Eyes: Positive for photophobia, pain and visual disturbance. Negative for discharge and redness.   Respiratory: Negative for shortness of breath and stridor.    Cardiovascular: Negative for chest pain and leg swelling.   Gastrointestinal: Positive for nausea. Negative for abdominal pain and vomiting.   Genitourinary: Negative for dysuria and hematuria.   Musculoskeletal: Negative for joint swelling and neck stiffness.   Skin: Negative for rash and wound.   Neurological: Positive for headaches. Negative for facial asymmetry and speech difficulty.   Psychiatric/Behavioral: Negative for agitation and confusion.       Physical Exam     Initial Vitals [08/29/17 1742]   BP Pulse Resp Temp SpO2   (!) 152/82 85 18 98.2 °F (36.8 °C) 97 %      MAP       105.33         Physical Exam    Nursing note and vitals reviewed.  Constitutional: She appears well-developed and well-nourished. She is not diaphoretic. No distress.   HENT:   Head: Normocephalic and atraumatic. Head is without left periorbital erythema.   Right Ear: External ear normal.   Left Ear: External ear normal.   Nose: Nose normal.   Mouth/Throat: Oropharynx is clear and moist.   Left orbital rim tenderness.   Eyes: Conjunctivae, EOM and lids are normal. Pupils are equal, round, and reactive to light. Right eye exhibits no discharge. Left eye exhibits no discharge. Right conjunctiva is not injected. Left conjunctiva is not injected. No scleral icterus.   Acuity  Both eyes: 20/200  Right: 20/70  Left: 20/100    IOP  Left: 15   Neck: Neck supple. No thyromegaly present. No tracheal deviation present. No JVD present.   Cardiovascular: Normal rate, regular rhythm, normal heart sounds and intact distal pulses. Exam reveals no gallop and no friction rub.    No murmur heard.  Pulmonary/Chest: Breath sounds normal. No stridor. No respiratory  distress. She has no wheezes. She has no rhonchi. She has no rales. She exhibits no tenderness.   Abdominal: Soft. Bowel sounds are normal. She exhibits no distension. There is no tenderness. There is no rebound and no guarding.   Musculoskeletal: Normal range of motion. She exhibits no edema.   Lymphadenopathy:     She has no cervical adenopathy.   Neurological: She is alert and oriented to person, place, and time. She has normal strength. No cranial nerve deficit or sensory deficit. Gait normal.   Skin: Skin is warm and dry.   Psychiatric: She has a normal mood and affect. Her behavior is normal.         ED Course   Procedures  Labs Reviewed   CBC W/ AUTO DIFFERENTIAL - Abnormal; Notable for the following:        Result Value    MCH 31.2 (*)     All other components within normal limits   COMPREHENSIVE METABOLIC PANEL - Abnormal; Notable for the following:     Potassium 3.2 (*)     CO2 30 (*)     BUN, Bld 4 (*)     All other components within normal limits   PROTIME-INR   SEDIMENTATION RATE, MANUAL   C-REACTIVE PROTEIN   POCT URINE PREGNANCY          HO-III MDM:  Patty Owens is a 51 y.o. female with h/o left sphenoid meningioma and migraines presents with new left eye pain.  Ddx includes malignancy, migraine, tension, glaucoma, temporal arteritis, venous dural thrombosis, ICH.    CBC, coags unremarkable.  CMP with K 3.2, will replete with 60 meq PO.  Spoke with neurosurgery resident, recommends MRI brain WWO as well as stealth protocol without fiducials.    Patient denies needing pain control at this time.    Sigifredo Young, PGY3 7:44 PM 09/06/2017    IOP left eye 15, glaucoma ruled out. ESR 5, CRP 2.2, temporal arteritis ruled out.    Will call neurosurgery to review MRI.    Sigifredo Young, PGY3 8:53 PM 09/06/2017    Neurosurgery evaluated patient:  -No acute neurosurgical intervention required  -Decadron loading dose 12mg, followed by 4q8 for 48h  -make appt for follow up with neurosurgery in 48  -Rec  checking IOP to rule out acute narrow angle glaucoma   -consider ophtho consult    Patient already had normal IOP. Patient was discharged with neurosurgery f/u.    Sigifredo Young, PGY3         Medical Decision Making:   History:   Old Medical Records: I decided to obtain old medical records.  Clinical Tests:   Lab Tests: Ordered and Reviewed  Radiological Study: Reviewed and Ordered            Scribe Attestation:   Scribe #1: I performed the above scribed service and the documentation accurately describes the services I performed. I attest to the accuracy of the note.    Attending Attestation:   Physician Attestation Statement for Resident:  As the supervising MD   Physician Attestation Statement: I have personally seen and examined this patient.   I agree with the above history. -: 51 y.o. female with known meningioma of the left sphenoid presents with acute left eye pain that started at 2 AM last night. She states that this is the worst pain she has ever felt and is severe. She states that it started in the eye, but is now around and behind the eye. She did take ibuprofen and fioricet with brief improvement. She has poor vision in the left eye at baseline, but worse today. She denies any redness, tearing, vomiting. No history or family history of glaucoma. She did have meningioma resection in 2013, but no further surgeries.    As the supervising MD I agree with the above PE.   -: On exam has left orbital tenderness. No redness. Pupils are equal and reactive.      As the supervising MD I agree with the above treatment, course, plan, and disposition.   -: Will order recommends MRI Brain with stealth protocol per Radiology recommendations.   I have reviewed and agree with the residents interpretation of the following: lab data.  I have reviewed the following: old records at this facility.          Physician Attestation for Scribe:  Physician Attestation Statement for Scribe #1: I, Dr. Ballesteros, reviewed  documentation, as scribed by Stephen Morgan in my presence, and it is both accurate and complete.         Attending ED Notes:   Patient with history of meningioma a sphenoid wing scinto once again to be evaluated here in the ED because of her complaints MRI with and without showed continue presence of this discussions were held with the consulting team patient is discharged is placed on Decadron also be given pain medication patient is discharged          ED Course      Clinical Impression:   The encounter diagnosis was Acute left eye pain.    Disposition:   Disposition: Discharged  Condition: Stable                        Sigifredo Young MD  Resident  08/31/17 0711       Myles Ballesteros MD  09/06/17 1214

## 2017-08-29 NOTE — TELEPHONE ENCOUNTER
----- Message from Shannan Goins sent at 8/29/2017  9:24 AM CDT -----  Contact: Patient [phone]  Patient would like to r/s appt with Dr. Roberts and to have MRI done, patient wants to know if she should go to ER to have an MRI done and have eye looked. Please call

## 2017-08-29 NOTE — TELEPHONE ENCOUNTER
----- Message from Arun López sent at 8/29/2017 12:09 PM CDT -----  Contact: pt   Nurse Baron  Pt missed a call and would like the nurse to return their call.        Pt can be reached at [phone].

## 2017-08-29 NOTE — TELEPHONE ENCOUNTER
Patient reports excrutiating pain behind left eye, states that she is having light sensitivity with a headache. Advised patient to proceed to ED for evaluation for further evaluation.

## 2017-08-30 ENCOUNTER — OFFICE VISIT (OUTPATIENT)
Dept: NEUROSURGERY | Facility: CLINIC | Age: 52
End: 2017-08-30
Payer: COMMERCIAL

## 2017-08-30 ENCOUNTER — TELEPHONE (OUTPATIENT)
Dept: NEUROSURGERY | Facility: CLINIC | Age: 52
End: 2017-08-30

## 2017-08-30 VITALS
WEIGHT: 149.06 LBS | DIASTOLIC BLOOD PRESSURE: 71 MMHG | BODY MASS INDEX: 26.41 KG/M2 | HEIGHT: 63 IN | SYSTOLIC BLOOD PRESSURE: 120 MMHG | HEART RATE: 89 BPM

## 2017-08-30 DIAGNOSIS — D32.9 MENINGIOMA: ICD-10-CM

## 2017-08-30 DIAGNOSIS — G93.89 BRAIN MASS: Primary | ICD-10-CM

## 2017-08-30 PROCEDURE — 99214 OFFICE O/P EST MOD 30 MIN: CPT | Mod: S$GLB,,, | Performed by: NEUROLOGICAL SURGERY

## 2017-08-30 PROCEDURE — 3008F BODY MASS INDEX DOCD: CPT | Mod: S$GLB,,, | Performed by: NEUROLOGICAL SURGERY

## 2017-08-30 PROCEDURE — 99999 PR PBB SHADOW E&M-EST. PATIENT-LVL III: CPT | Mod: PBBFAC,,, | Performed by: NEUROLOGICAL SURGERY

## 2017-08-30 RX ORDER — BUTALBITAL, ACETAMINOPHEN AND CAFFEINE 50; 325; 40 MG/1; MG/1; MG/1
1 TABLET ORAL
COMMUNITY
End: 2021-01-12

## 2017-08-30 RX ORDER — MORPHINE SULFATE 15 MG/1
15 TABLET ORAL EVERY 4 HOURS PRN
COMMUNITY
End: 2018-11-29 | Stop reason: SDUPTHER

## 2017-08-30 RX ORDER — TIZANIDINE 4 MG/1
4 TABLET ORAL
COMMUNITY

## 2017-08-30 RX ORDER — LOSARTAN POTASSIUM 25 MG/1
25 TABLET ORAL DAILY
COMMUNITY
End: 2018-02-27

## 2017-08-30 NOTE — SUBJECTIVE & OBJECTIVE
(Not in a hospital admission)    Review of patient's allergies indicates:   Allergen Reactions    Darvocet a500 [propoxyphene n-acetaminophen] Other (See Comments)     GI      Tigan [trimethobenzamide] Anaphylaxis     Dystonic reaction    Darvon [propoxyphene]     Topamax [topiramate] Rash       Past Medical History:   Diagnosis Date    Allergy     Anxiety     Cataract     Fibromyalgia     Hypertension     Meningioma     Migraines      Past Surgical History:   Procedure Laterality Date    CRANIOTOMY  05/08/2013    cyber knife  11/2013    TONSILLECTOMY  1984     Family History     None        Social History Main Topics    Smoking status: Former Smoker    Smokeless tobacco: Never Used    Alcohol use No    Drug use: No    Sexual activity: Not on file     Review of Systems   Constitutional: Negative for chills and fever.   Eyes: Positive for photophobia, pain and visual disturbance.   Musculoskeletal: Negative for neck pain and neck stiffness.   Neurological: Positive for numbness (intermittent on left side of face) and headaches. Negative for dizziness and weakness.     Objective:     Weight: 68 kg (150 lb)  Body mass index is 26.57 kg/m².  Vital Signs (Most Recent):  Temp: 98.2 °F (36.8 °C) (08/29/17 1742)  Pulse: 75 (08/29/17 2125)  Resp: 18 (08/29/17 2125)  BP: 134/71 (08/29/17 2125)  SpO2: 99 % (08/29/17 2125) Vital Signs (24h Range):  Temp:  [98.2 °F (36.8 °C)] 98.2 °F (36.8 °C)  Pulse:  [75-85] 75  Resp:  [18] 18  SpO2:  [97 %-99 %] 99 %  BP: (134-152)/(71-82) 134/71                           Neurosurgery Physical Exam  Vital signs: reviewed above.   Constitutional: well-developed, no apparent distress  Neurological  GCS 15  Alert, Oriented to person, place, time  Speech/Language: intact  Head: normocephalic, atraumatic   PERRL  EOMI  Left Visual fields deficit.   Facial sensation intact to light touch bilaterally.   Facial expression symmetric  Tongue midline  Follows commands x4       RUE:  5/5       RLE: 5/5       LUE: 5/5        LLE: 5/5  Tone: no spasticity, no rigidity, no clonus, no atrophy  Pronator Drift: neg   Sensation to Light touch: intact  Coordination: Finger-to-nose intact    Significant Labs:    Recent Labs  Lab 08/29/17 1848   GLU 96      K 3.2*      CO2 30*   BUN 4*   CREATININE 0.8   CALCIUM 9.1       Recent Labs  Lab 08/29/17 1848   WBC 4.79   HGB 13.2   HCT 38.2          Recent Labs  Lab 08/29/17 1848   INR 0.9     Microbiology Results (last 7 days)     ** No results found for the last 168 hours. **        All pertinent labs from the last 24 hours have been reviewed.    Significant Diagnostics:  MRI: Mri Brain W Wo Contrast    Result Date: 8/29/2017   Enhancing lesion along the left lateral aspect of the cavernous sinus, with extension along the left sphenoid wing and possible extension into the foramen rotundum and the LEFT infratemporal fossa.  This finding is favored to correspond to patient's given history of a left sphenoid wing meningioma. Supratentorial white matter T2/flair hyperintense signal foci suggesting sequela of chronic small vessel ischemic change. ______________________________________ Electronically signed by resident: VADIM YUNG MD Date:     08/29/17 Time:    20:37 As the supervising and teaching physician, I personally reviewed the images and resident's interpretation and I agree with the findings. Electronically signed by: PILAR MORRIS MD Date:     08/29/17 Time:    20:52

## 2017-08-30 NOTE — TELEPHONE ENCOUNTER
Offered patient appt today with Dr. Roberts at 1030, patient agreed to appt date, time and location.

## 2017-08-30 NOTE — PROGRESS NOTES
"Subjective:    I, Edwin Smith, attest that this documentation has been prepared under the direction and in the presence of Castillo Roberts MD.     Patient ID: Patty Owens is a 51 y.o. female.    Chief Complaint: Follow-up (ED )    HPI This is a 50 y/o female with a residual/recurrent sphenoid wing meningioma on the left side, who presents today for follow up evaluation with a new MRI Brain. At last visit, she complained of left temporal headaches, numbness, and "shooting" pain in this region. She also reported worsening vision and intermittent left eye pain and drooping. Today, the patient reports waking up 2 nights ago with excruciated left eye pain. She notes being unable to open her eye, secondary to severe photophobia. Now, she reports continued soreness in the left eye and intermittent blurred vision. The patient also describes some nausea. She denies any past diagnosis of ocular migraines     Review of Systems   Constitutional: Negative for activity change, fatigue, fever and unexpected weight change.   HENT: Negative for facial swelling.    Eyes: Positive for pain (left).   Respiratory: Negative.    Cardiovascular: Negative.    Gastrointestinal: Positive for nausea. Negative for diarrhea and vomiting.   Genitourinary: Negative.    Musculoskeletal: Negative for back pain, joint swelling, myalgias and neck pain.   Neurological: Negative for dizziness, weakness, numbness and headaches.   Psychiatric/Behavioral: Negative.        Past Medical History:   Diagnosis Date    Allergy     Anxiety     Cataract     Fibromyalgia     Hypertension     Meningioma     Migraines        Objective:     /71 (BP Location: Right arm, Patient Position: Sitting, BP Method: Medium (Automatic))   Pulse 89   Ht 5' 3" (1.6 m)   Wt 67.6 kg (149 lb 0.5 oz)   LMP 08/02/2017 (Approximate)   BMI 26.40 kg/m²     Physical Exam   Constitutional: She is oriented to person, place, and time. She appears well-developed and " well-nourished.   HENT:   Head: Normocephalic and atraumatic.   Neck: Neck supple.   Neurological: She is alert and oriented to person, place, and time. No cranial nerve deficit. She displays a negative Romberg sign. GCS eye subscore is 4. GCS verbal subscore is 5. GCS motor subscore is 6.       MRI Brain w/wo contrast, dated 8/29/2017 shows small tumors along the left sphenoid ridge and left lateral aspect of the cavernous sinus. Compared to previous image on 10/31/2016, the areas of enhancement have decreased in size and there is evidence of radiation effects.      I have personally reviewed the images with the pt.      I, Dr. Castillo Roberts personally performed the services described in this documentation. All medical record entries made by the scribe, Edwin Smith, were at my direction and in my presence.  I have reviewed the chart and agree that the record reflects my personal performance and is accurate and complete.    Assessment:       1. Brain mass    2. Meningioma        Plan:   I have reviewed the imaging with the patient, and it appears that the tumor along her left sphenoid ridge and the left lateral aspect of the cavernous sinus have decreased in size. The patient will see Dr. Hewitt and I will schedule to follow up with her on that same day.

## 2017-08-30 NOTE — HPI
51yoF with h/o left sphenoid meningioma s/p resection then radiation in 2013 presents with left eye pain that awoke her from sleep. She describes the pain as pressure. She reports blurry vision, photophobia, and left sided headache. The pain has been constant but has abated some since onset. She reports baseline deficits in peripheral vision.

## 2017-08-30 NOTE — CONSULTS
[facility]  Neurosurgery  Consult Note    Consults  Subjective:     Chief Complaint/Reason for Admission: left eye pain    History of Present Illness: 51yoF with h/o left sphenoid meningioma s/p resection then radiation in 2013 presents with left eye pain that awoke her from sleep. She describes the pain as pressure. She reports blurry vision, photophobia, and left sided headache. The pain has been constant but has abated some since onset. She reports baseline deficits in peripheral vision.      (Not in a hospital admission)    Review of patient's allergies indicates:   Allergen Reactions    Darvocet a500 [propoxyphene n-acetaminophen] Other (See Comments)     GI      Tigan [trimethobenzamide] Anaphylaxis     Dystonic reaction    Darvon [propoxyphene]     Topamax [topiramate] Rash       Past Medical History:   Diagnosis Date    Allergy     Anxiety     Cataract     Fibromyalgia     Hypertension     Meningioma     Migraines      Past Surgical History:   Procedure Laterality Date    CRANIOTOMY  05/08/2013    cyber knife  11/2013    TONSILLECTOMY  1984     Family History     None        Social History Main Topics    Smoking status: Former Smoker    Smokeless tobacco: Never Used    Alcohol use No    Drug use: No    Sexual activity: Not on file     Review of Systems   Constitutional: Negative for chills and fever.   Eyes: Positive for photophobia, pain and visual disturbance.   Musculoskeletal: Negative for neck pain and neck stiffness.   Neurological: Positive for numbness (intermittent on left side of face) and headaches. Negative for dizziness and weakness.     Objective:     Weight: 68 kg (150 lb)  Body mass index is 26.57 kg/m².  Vital Signs (Most Recent):  Temp: 98.2 °F (36.8 °C) (08/29/17 1742)  Pulse: 75 (08/29/17 2125)  Resp: 18 (08/29/17 2125)  BP: 134/71 (08/29/17 2125)  SpO2: 99 % (08/29/17 2125) Vital Signs (24h Range):  Temp:  [98.2 °F (36.8 °C)] 98.2 °F (36.8  °C)  Pulse:  [75-85] 75  Resp:  [18] 18  SpO2:  [97 %-99 %] 99 %  BP: (134-152)/(71-82) 134/71                           Neurosurgery Physical Exam  Vital signs: reviewed above.   Constitutional: well-developed, no apparent distress  Neurological  GCS 15  Alert, Oriented to person, place, time  Speech/Language: intact  Head: normocephalic, atraumatic   PERRL  EOMI  Left Visual fields deficit.   Facial sensation intact to light touch bilaterally.   Facial expression symmetric  Tongue midline  Follows commands x4       RUE: 5/5       RLE: 5/5       LUE: 5/5        LLE: 5/5  Tone: no spasticity, no rigidity, no clonus, no atrophy  Pronator Drift: neg   Sensation to Light touch: intact  Coordination: Finger-to-nose intact    Significant Labs:    Recent Labs  Lab 08/29/17 1848   GLU 96      K 3.2*      CO2 30*   BUN 4*   CREATININE 0.8   CALCIUM 9.1       Recent Labs  Lab 08/29/17  1848   WBC 4.79   HGB 13.2   HCT 38.2          Recent Labs  Lab 08/29/17  1848   INR 0.9     Microbiology Results (last 7 days)     ** No results found for the last 168 hours. **        All pertinent labs from the last 24 hours have been reviewed.    Significant Diagnostics:  MRI: Mri Brain W Wo Contrast    Result Date: 8/29/2017   Enhancing lesion along the left lateral aspect of the cavernous sinus, with extension along the left sphenoid wing and possible extension into the foramen rotundum and the LEFT infratemporal fossa.  This finding is favored to correspond to patient's given history of a left sphenoid wing meningioma. Supratentorial white matter T2/flair hyperintense signal foci suggesting sequela of chronic small vessel ischemic change. ______________________________________ Electronically signed by resident: VADIM YUNG MD Date:     08/29/17 Time:    20:37 As the supervising and teaching physician, I personally reviewed the images and resident's interpretation and I agree with the findings. Electronically  signed by: PILAR MORRIS MD Date:     08/29/17 Time:    20:52     Assessment/Plan:     Intracranial meningioma    51F with left sphenoid wing meningioma p/w exacerbation of left eye pain    -pt with history of left sided vision changes and intermittent left eye pain  -No acute neurosurgical intervention required  -Decadron loading dose 12mg, followed by 4q8 for 48h  -make appt for follow up with neurosurgery in 48  -Rec checking IOP to rule out acute narrow angle glaucoma   -consider ophtho consult    Discussed with Dr. Boogie            Thank you for your consult. I will sign off. Please contact us if you have any additional questions.    Gilles Santa, DO  Neurosurgery  [facility]

## 2017-08-30 NOTE — ASSESSMENT & PLAN NOTE
51F with left sphenoid wing meningioma p/w exacerbation of left eye pain    -pt with history of left sided vision changes and intermittent left eye pain  -No acute neurosurgical intervention required  -Decadron loading dose 12mg, followed by 4q8 for 48h  -make appt for follow up with neurosurgery in 48  -Rec checking IOP to rule out acute narrow angle glaucoma   -consider ophtho consult    Discussed with Dr. Boogie

## 2017-08-30 NOTE — PATIENT INSTRUCTIONS
I have reviewed the imaging with the patient, and it appears that the small lesions along her left sphenoid ridge and the left lateral aspect of the cavernous sinus have decreased in size. The patient will see Dr. Hewitt and I will schedule to follow up with her on that same day.

## 2017-09-26 ENCOUNTER — OFFICE VISIT (OUTPATIENT)
Dept: NEUROSURGERY | Facility: CLINIC | Age: 52
End: 2017-09-26
Payer: COMMERCIAL

## 2017-09-26 ENCOUNTER — CLINICAL SUPPORT (OUTPATIENT)
Dept: OPHTHALMOLOGY | Facility: CLINIC | Age: 52
End: 2017-09-26
Payer: COMMERCIAL

## 2017-09-26 ENCOUNTER — OFFICE VISIT (OUTPATIENT)
Dept: OPHTHALMOLOGY | Facility: CLINIC | Age: 52
End: 2017-09-26
Payer: COMMERCIAL

## 2017-09-26 VITALS
RESPIRATION RATE: 13 BRPM | HEIGHT: 63 IN | DIASTOLIC BLOOD PRESSURE: 87 MMHG | WEIGHT: 147.06 LBS | HEART RATE: 92 BPM | SYSTOLIC BLOOD PRESSURE: 136 MMHG | BODY MASS INDEX: 26.06 KG/M2

## 2017-09-26 DIAGNOSIS — D32.0 INTRACRANIAL MENINGIOMA: ICD-10-CM

## 2017-09-26 DIAGNOSIS — D32.9 MENINGIOMA: Primary | ICD-10-CM

## 2017-09-26 DIAGNOSIS — H47.292: ICD-10-CM

## 2017-09-26 DIAGNOSIS — H53.413 CENTRAL SCOTOMA, BOTH EYES: Primary | ICD-10-CM

## 2017-09-26 PROCEDURE — 3008F BODY MASS INDEX DOCD: CPT | Mod: S$GLB,,, | Performed by: NEUROLOGICAL SURGERY

## 2017-09-26 PROCEDURE — 92015 DETERMINE REFRACTIVE STATE: CPT | Mod: S$GLB,,, | Performed by: OPHTHALMOLOGY

## 2017-09-26 PROCEDURE — 92083 EXTENDED VISUAL FIELD XM: CPT | Mod: S$GLB,,, | Performed by: OPHTHALMOLOGY

## 2017-09-26 PROCEDURE — 99999 PR PBB SHADOW E&M-EST. PATIENT-LVL III: CPT | Mod: PBBFAC,,, | Performed by: NEUROLOGICAL SURGERY

## 2017-09-26 PROCEDURE — 99999 PR PBB SHADOW E&M-EST. PATIENT-LVL III: CPT | Mod: PBBFAC,,, | Performed by: OPHTHALMOLOGY

## 2017-09-26 PROCEDURE — 99214 OFFICE O/P EST MOD 30 MIN: CPT | Mod: S$GLB,,, | Performed by: NEUROLOGICAL SURGERY

## 2017-09-26 PROCEDURE — 92012 INTRM OPH EXAM EST PATIENT: CPT | Mod: S$GLB,,, | Performed by: OPHTHALMOLOGY

## 2017-09-26 RX ORDER — CARBAMAZEPINE 200 MG/1
200 TABLET ORAL 2 TIMES DAILY
Qty: 60 TABLET | Refills: 11 | Status: SHIPPED | OUTPATIENT
Start: 2017-09-26 | End: 2021-01-12

## 2017-09-26 NOTE — PROGRESS NOTES
"Subjective:    I, Mary Ching, am scribing for, and in the presence of, Dr. Castillo Roberts.     Patient ID: Patty Owens is a 51 y.o. female.    Chief Complaint: No chief complaint on file.    HPI   Pt is a 50 yo female with a meningioma who presents today for FU after Dr. Hewitt on 9/26/2017.During last office visit on 8/30/2017, pt complained of left temporal headaches, numbness, and "shooting" pain in this region. She also complained of worsening vision and intermittent left eye pain and drooping. Pt reported continued soreness in left eye and intermittent blurred vision. Today, pt complains of headaches, but states she is no longer experiencing excrutiating pain in the eyes. Pt states she is still experiencing intermittent blurriness, and is able to relieve the blurriness by blinking her eyes. In the past, pt has tried Lyrica and Lamictal for her pain.     Ophthalmology 9/26/2017: Ms. Owens's optic nerve function appears stable in both eyes. Her visual fields suggest that she is having difficulty in the region of the optic chiasm.        Review of Systems   Constitutional: Negative for chills and fever.   HENT: Negative.    Eyes: Positive for visual disturbance.   Respiratory: Negative.    Cardiovascular: Negative.    Gastrointestinal: Negative.    Endocrine: Negative.    Genitourinary: Negative.    Musculoskeletal: Negative.    Skin: Negative.    Allergic/Immunologic: Negative.    Neurological: Positive for headaches. Negative for tremors, weakness, light-headedness and numbness.   Hematological: Negative.    Psychiatric/Behavioral: Negative.        Past Medical History:   Diagnosis Date    Allergy     Anxiety     Cataract     Fibromyalgia     Hypertension     Meningioma     Migraines        Objective:     /87 (BP Location: Left arm, Patient Position: Sitting, BP Method: Medium (Automatic))   Pulse 92   Resp 13   Ht 5' 3" (1.6 m)   Wt 66.7 kg (147 lb 0.8 oz)   LMP 08/02/2017 (Approximate)   " BMI 26.05 kg/m²     Physical Exam   Constitutional: She is oriented to person, place, and time. She appears well-developed and well-nourished.   Eyes: Pupils are equal, round, and reactive to light.   Neurological: She is alert and oriented to person, place, and time. No cranial nerve deficit.       Imaging:  MRI Brain W WO Contrast 8/29/2017 shows a stable sphenoid wing meningioma. Optic nerves are clear of tumor.     I have personally reviewed the images with the pt.      I, Dr. Castillo Roberts, personally performed the services described in this documentation as scribed by Mary Ching in my presence, and it is both accurate and complete.    Assessment:       Meningioma.  Adverse radiation effect.    Plan:   I have reviewed the patient's MRI of brain, which shows a stable sphenoid wing meningioma. Optic nerves are clear of tumor. I will prescribe the patient Tegretol (Carbamezapine) for her neuropathic pain. I recommend the patient takes Tegretol 2 times a day. I will schedule the patient a 6 month FU with MRI of brain.

## 2017-09-26 NOTE — PROGRESS NOTES
HPI     Concerns About Ocular Health    Additional comments: Intracranial meningioma            Comments   DLS:03/28/2017 Marcelina  Patient here for 6 months follow up and Review HVF.  Pt states OU vision progressively getting worse. People faces tend to be   blurry more than normal.  8 on pain scale(usually an 8)    I have personally interviewed the patient, reviewed the history and   examined the patient and agree with the technician's exam.       Last edited by Marty Hewitt MD on 9/26/2017  1:10 PM. (History)            Assessment /Plan     For exam results, see Encounter Report.    Central scotoma, both eyes  -     Mcleod Visual Field - OU - Extended - Both Eyes    Secondary optic atrophy of left eye  -     Mcleod Visual Field - OU - Extended - Both Eyes    Intracranial meningioma  -     Mcleod Visual Field - OU - Extended - Both Eyes      Ms. Owens's optic nerve function appears stable in both eyes. Her visual fields suggest that she is having difficulty in the region of the optic chiasm. I will repeat her exam and visual field testing in six months or sooner if requested.

## 2017-09-26 NOTE — PATIENT INSTRUCTIONS
I have reviewed the patient's MRI of brain, which shows a stable sphenoid wing meningioma. Optic nerves are clear of tumor. I will prescribe the patient Tegretol (Carbamezapine) for her neuropathic pain. I recommend the patient takes Tegretol 2 times a day. I will schedule the patient a 6 month FU with MRI of brain.

## 2018-02-27 ENCOUNTER — HOSPITAL ENCOUNTER (OUTPATIENT)
Dept: RADIOLOGY | Facility: HOSPITAL | Age: 53
Discharge: HOME | End: 2018-02-27
Attending: NEUROLOGICAL SURGERY
Payer: COMMERCIAL

## 2018-02-27 ENCOUNTER — TELEPHONE (OUTPATIENT)
Dept: NEUROSURGERY | Facility: CLINIC | Age: 53
End: 2018-02-27

## 2018-02-27 ENCOUNTER — OFFICE VISIT (OUTPATIENT)
Dept: NEUROSURGERY | Facility: CLINIC | Age: 53
End: 2018-02-27
Payer: COMMERCIAL

## 2018-02-27 VITALS
DIASTOLIC BLOOD PRESSURE: 90 MMHG | HEIGHT: 63 IN | SYSTOLIC BLOOD PRESSURE: 144 MMHG | BODY MASS INDEX: 26.01 KG/M2 | WEIGHT: 146.81 LBS | HEART RATE: 81 BPM

## 2018-02-27 DIAGNOSIS — G93.89 BRAIN MASS: ICD-10-CM

## 2018-02-27 DIAGNOSIS — D32.9 MENINGIOMA: Primary | ICD-10-CM

## 2018-02-27 DIAGNOSIS — D32.9 MENINGIOMA: ICD-10-CM

## 2018-02-27 PROCEDURE — 70553 MRI BRAIN STEM W/O & W/DYE: CPT | Mod: 26,,, | Performed by: RADIOLOGY

## 2018-02-27 PROCEDURE — 3008F BODY MASS INDEX DOCD: CPT | Mod: S$GLB,,, | Performed by: NEUROLOGICAL SURGERY

## 2018-02-27 PROCEDURE — 99999 PR PBB SHADOW E&M-EST. PATIENT-LVL III: CPT | Mod: PBBFAC,,, | Performed by: NEUROLOGICAL SURGERY

## 2018-02-27 PROCEDURE — 25500020 PHARM REV CODE 255: Performed by: NEUROLOGICAL SURGERY

## 2018-02-27 PROCEDURE — 70553 MRI BRAIN STEM W/O & W/DYE: CPT | Mod: TC

## 2018-02-27 PROCEDURE — 99214 OFFICE O/P EST MOD 30 MIN: CPT | Mod: S$GLB,,, | Performed by: NEUROLOGICAL SURGERY

## 2018-02-27 PROCEDURE — A9585 GADOBUTROL INJECTION: HCPCS | Performed by: NEUROLOGICAL SURGERY

## 2018-02-27 RX ORDER — ACETAZOLAMIDE 250 MG/1
250 TABLET ORAL 3 TIMES DAILY
COMMUNITY
End: 2018-11-29

## 2018-02-27 RX ORDER — DULOXETIN HYDROCHLORIDE 30 MG/1
30 CAPSULE, DELAYED RELEASE ORAL DAILY
Qty: 30 CAPSULE | Refills: 11 | Status: SHIPPED | OUTPATIENT
Start: 2018-02-27 | End: 2021-01-12

## 2018-02-27 RX ORDER — GADOBUTROL 604.72 MG/ML
7 INJECTION INTRAVENOUS
Status: COMPLETED | OUTPATIENT
Start: 2018-02-27 | End: 2018-02-27

## 2018-02-27 RX ADMIN — GADOBUTROL 7 ML: 604.72 INJECTION INTRAVENOUS at 11:02

## 2018-02-27 NOTE — PATIENT INSTRUCTIONS
I have reviewed the patient's MRI brain, which shows stable small residual tumor located in middle fossa with associated fatty changes that has occurred in the muscle supplied by the 5th cranial nerve associated with denervation injury.     I recommend the patient to FU with a Psychologist for her current symptoms, I will refer the patient.     I recommend the patient to begin taking Cymbalta for her symptoms.     I recommend that the patient FU with Dr. Lesli Puga (Deep Brain Stimulation Neurosurgeon) in [place], [place]. I will refer the patient.     I will schedule the patient for 1 year FU with MRI brain.

## 2018-02-27 NOTE — PROGRESS NOTES
"Subjective:    I, Mary Ching, am scribing for, and in the presence of, Dr. Castillo Roberts.     Patient ID: Patty Owens is a 52 y.o. female.    Chief Complaint: Follow-up    HPI   Pt is a 53 yo female with a meningioma who presents today for 6 month FU with MRI. Pt reports constant headaches and visual disturbance. Pt also reports facial numbness. Pt is currently taking Tegretol (200 mg bid), stating that her intensity in pain has decreased since taking the medication. Pt states when her Neurologist Dr. Baptiste increased her Tegretol dosage she started experiencing an adverse side effect of frequent nausea. Pt is currently taking Morphine, Fioricet, Aleve, and Seroquel. Pt also reports using an ice pack to alleviate her symptoms.     Pt states when experiencing a headache or when she becomes overwhelmed she is unable to focus. Pt describes this episode as an anxiety attack. 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 Pt denies taking Cymbalta for her symptoms.     In the past, the pt has FU with a Psychiatrist.     Review of Systems   Constitutional: Negative for chills and fever.   HENT: Negative.    Eyes: Positive for visual disturbance.   Respiratory: Negative.    Cardiovascular: Negative.    Gastrointestinal: Positive for nausea.   Endocrine: Negative.    Genitourinary: Negative.    Musculoskeletal: Negative.    Skin: Negative.    Allergic/Immunologic: Negative.    Neurological: Positive for headaches. Negative for tremors, weakness, light-headedness and numbness.   Hematological: Negative.    Psychiatric/Behavioral: The patient is nervous/anxious.        Past Medical History:   Diagnosis Date    Allergy     Anxiety     Cataract     Fibromyalgia     Hypertension     Meningioma     Migraines        Objective:     BP (!) 144/90   Pulse 81   Ht 5' 3" (1.6 m)   Wt 66.6 kg (146 lb 13.2 oz)   BMI 26.01 kg/m²     Physical Exam   Constitutional: She is oriented to person, " place, and time. She appears well-developed and well-nourished.   Eyes: Pupils are equal, round, and reactive to light.   Neurological: She is alert and oriented to person, place, and time. No cranial nerve deficit.       Imaging:  MRI Brain W WO Contrast 2/27/2018 shows stable small residual tumor located in middle fossa with associated fatty changes that has occurred in the muscle supplied by the 5th cranial nerve associated with denervation injury.     I have personally reviewed the images with the pt.      I, Dr. Castillo Roberts, personally performed the services described in this documentation. All medical record entries made by the scribe were at my direction and in my presence.  I have reviewed the chart and agree that the record reflects my personal performance and is accurate and complete. Castillo Roberts MD.  12:15 PM 02/27/2018    Assessment:       1. Meningioma    2. Brain mass        Plan:   I have reviewed the patient's MRI brain, which shows stable small residual tumor located in middle fossa with associated fatty changes that has occurred in the muscle supplied by the 5th cranial nerve associated with denervation injury.     I recommend the patient to FU with a Psychologist for her current symptoms, I will refer the patient.     I recommend the patient to begin taking Cymbalta for her symptoms.     I recommend that the patient FU with Dr. Lesli Puga (Deep Brain Stimulation Neurosurgeon) in [place], [place]. I will refer the patient.     I will schedule the patient for 1 year FU with MRI brain.

## 2018-03-23 ENCOUNTER — TELEPHONE (OUTPATIENT)
Dept: NEUROSURGERY | Facility: CLINIC | Age: 53
End: 2018-03-23

## 2018-03-23 DIAGNOSIS — D32.9 MENINGIOMA: Primary | ICD-10-CM

## 2018-08-06 ENCOUNTER — TELEPHONE (OUTPATIENT)
Dept: PHARMACY | Facility: CLINIC | Age: 53
End: 2018-08-06

## 2018-08-08 NOTE — TELEPHONE ENCOUNTER
DOCUMENTATION ONLY  The prior authorization request for Botox was denied by the patient's insurance.   Forwarded to the clinical pharmacist for review. 12:04pm ARR

## 2018-11-29 ENCOUNTER — OFFICE VISIT (OUTPATIENT)
Dept: OPHTHALMOLOGY | Facility: CLINIC | Age: 53
End: 2018-11-29
Payer: COMMERCIAL

## 2018-11-29 ENCOUNTER — CLINICAL SUPPORT (OUTPATIENT)
Dept: OPHTHALMOLOGY | Facility: CLINIC | Age: 53
End: 2018-11-29
Payer: COMMERCIAL

## 2018-11-29 DIAGNOSIS — D32.0 INTRACRANIAL MENINGIOMA: ICD-10-CM

## 2018-11-29 DIAGNOSIS — H47.293 SECONDARY OPTIC ATROPHY OF BOTH EYES: ICD-10-CM

## 2018-11-29 DIAGNOSIS — H53.413 CENTRAL SCOTOMA, BOTH EYES: Primary | ICD-10-CM

## 2018-11-29 PROCEDURE — 99999 PR PBB SHADOW E&M-EST. PATIENT-LVL I: CPT | Mod: PBBFAC,,,

## 2018-11-29 PROCEDURE — 92083 EXTENDED VISUAL FIELD XM: CPT | Mod: S$GLB,,, | Performed by: OPHTHALMOLOGY

## 2018-11-29 PROCEDURE — 92014 COMPRE OPH EXAM EST PT 1/>: CPT | Mod: S$GLB,,, | Performed by: OPHTHALMOLOGY

## 2018-11-29 PROCEDURE — 99999 PR PBB SHADOW E&M-EST. PATIENT-LVL III: CPT | Mod: PBBFAC,,, | Performed by: OPHTHALMOLOGY

## 2018-11-29 RX ORDER — TOPIRAMATE 100 MG/1
TABLET, FILM COATED ORAL
COMMUNITY
End: 2021-01-12

## 2018-11-29 RX ORDER — CHOLECALCIFEROL (VITAMIN D3) 125 MCG
CAPSULE ORAL
COMMUNITY

## 2018-11-29 RX ORDER — BUTALBITAL, ACETAMINOPHEN, CAFFEINE AND CODEINE PHOSPHATE 50; 325; 40; 30 MG/1; MG/1; MG/1; MG/1
CAPSULE ORAL
COMMUNITY

## 2018-11-29 RX ORDER — HYDROXYZINE PAMOATE 25 MG/1
CAPSULE ORAL
COMMUNITY
End: 2021-01-12

## 2018-11-29 RX ORDER — FLAXSEED OIL 1000 MG
CAPSULE ORAL
COMMUNITY
End: 2021-01-12

## 2018-11-29 RX ORDER — DIVALPROEX SODIUM 250 MG/1
TABLET, DELAYED RELEASE ORAL
COMMUNITY
End: 2021-01-12

## 2018-11-29 RX ORDER — CEPHALEXIN 500 MG/1
CAPSULE ORAL
COMMUNITY
End: 2019-02-12

## 2018-11-29 RX ORDER — NORTRIPTYLINE HYDROCHLORIDE 75 MG/1
CAPSULE ORAL
COMMUNITY
End: 2019-02-12

## 2018-11-29 RX ORDER — IBUPROFEN 100 MG/5ML
1000 SUSPENSION, ORAL (FINAL DOSE FORM) ORAL
COMMUNITY
End: 2021-01-12

## 2018-11-29 RX ORDER — DEXTROAMPHETAMINE SACCHARATE, AMPHETAMINE ASPARTATE, DEXTROAMPHETAMINE SULFATE AND AMPHETAMINE SULFATE 3.75; 3.75; 3.75; 3.75 MG/1; MG/1; MG/1; MG/1
TABLET ORAL
COMMUNITY
Start: 2018-08-09 | End: 2021-01-12 | Stop reason: DRUGHIGH

## 2018-11-29 RX ORDER — LORAZEPAM 0.5 MG/1
0.5 TABLET ORAL DAILY PRN
COMMUNITY
End: 2021-01-12

## 2018-11-29 NOTE — PROGRESS NOTES
HPI     Concerns About Ocular Health      Additional comments: Central Scotoma              Comments     DLS:09/26/2017 Marcelina  Patient here for annual check up and Review HVF.  Pt states OU vision seem to be progressively getting worse.  No eye pain.    I have personally interviewed the patient, reviewed the history and   examined the patient and agree with the technician's exam.          Last edited by Marty Hewitt MD on 11/29/2018 10:18 AM. (History)            Assessment /Plan     For exam results, see Encounter Report.    Central scotoma, both eyes  -     Mcleod Visual Field - OU - Extended - Both Eyes    Intracranial meningioma  -     Mcleod Visual Field - OU - Extended - Both Eyes    Secondary optic atrophy of both eyes  -     Mcleod Visual Field - OU - Extended - Both Eyes      Ms. Owens is showing gradual deterioration of the vision in both eyes with worsening of her visual field defects. A recent MRI did not demonstrate significant changes in her residual meningioma but radiation necrosis could be contributing to her visual loss. I will repeat her exam and visual field testing in one year.

## 2018-11-29 NOTE — LETTER
[facility]  [address]  [facility] [place] [address]  Phone: [phone]  Fax: [phone]   November 29, 2018    Castillo Roberts MD  [address]  [place] [address]    Patient: Patty Owens   MR Number: [id number]   YOB: 1965   Date of Visit: 11/29/2018       Dear Dr. Roberts:    Thank you for referring Patty Owens to me for evaluation. Here is my assessment and plan of care:    Assessment:   /Plan     For exam results, see Encounter Report.    Central scotoma, both eyes  -     Mcleod Visual Field - OU - Extended - Both Eyes    Intracranial meningioma  -     Mcleod Visual Field - OU - Extended - Both Eyes    Secondary optic atrophy of both eyes  -     Mcleod Visual Field - OU - Extended - Both Eyes      Ms. Owens is showing gradual deterioration of the vision in both eyes with worsening of her visual field defects. A recent MRI did not demonstrate significant changes in her residual meningioma but radiation necrosis could be contributing to her visual loss. I will repeat her exam and visual field testing in one year.          Plan:       For exam results, see Encounter Report.    Central scotoma, both eyes  -     Mcleod Visual Field - OU - Extended - Both Eyes    Intracranial meningioma  -     Mcleod Visual Field - OU - Extended - Both Eyes    Secondary optic atrophy of both eyes  -     Mcleod Visual Field - OU - Extended - Both Eyes      Ms. Owens is showing gradual deterioration of the vision in both eyes with worsening of her visual field defects. A recent MRI did not demonstrate significant changes in her residual meningioma but radiation necrosis could be contributing to her visual loss. I will repeat her exam and visual field testing in one year.            Below you will find my full exam findings. If you have questions, please do not hesitate to call me. I look forward to following Ms. Patty Owens along with you.    Sincerely,          Marty GOFF  MD Marcelina       CC  No Recipients             Base Eye Exam     Visual Acuity (Snellen - Linear)       Right Left    Dist sc 20/50 20/50 -1    Dist cc 20/50 20/40 +1          Tonometry (Applanation, 10:31 AM)       Right Left    Pressure 20 20          Pupils       Dark Light Shape React APD    Right 7 5 Round Slow +1    Left 7 5 Round Slow +2          Visual Fields    See HVF report.           Extraocular Movement       Right Left     Full, Ortho Full, Ortho          Neuro/Psych     Oriented x3:  Yes    Mood/Affect:  Normal          Dilation     Both eyes:  1% Mydriacyl, 2.5% Phenylephrine @ 10:32 AM            Slit Lamp and Fundus Exam     External Exam       Right Left    External Normal Normal          Slit Lamp Exam       Right Left    Lids/Lashes Normal Normal    Conjunctiva/Sclera White and quiet White and quiet    Cornea Clear Clear    Anterior Chamber Deep and quiet Deep and quiet    Iris Round and reactive Round and reactive    Lens Clear Clear          Fundus Exam       Right Left    Disc Trace pallor 1+ pallor    C/D Ratio 0.2 0.2    Macula Normal Normal    Vessels Normal Normal    Periphery Normal Normal

## 2018-12-06 ENCOUNTER — OFFICE VISIT (OUTPATIENT)
Dept: OPTOMETRY | Facility: CLINIC | Age: 53
End: 2018-12-06
Payer: COMMERCIAL

## 2018-12-06 DIAGNOSIS — D32.0 INTRACRANIAL MENINGIOMA: ICD-10-CM

## 2018-12-06 DIAGNOSIS — H47.293 SECONDARY OPTIC ATROPHY OF BOTH EYES: ICD-10-CM

## 2018-12-06 DIAGNOSIS — H52.03 HYPEROPIA, BILATERAL: Primary | ICD-10-CM

## 2018-12-06 DIAGNOSIS — H53.413 CENTRAL SCOTOMA, BOTH EYES: ICD-10-CM

## 2018-12-06 DIAGNOSIS — H52.4 PRESBYOPIA: ICD-10-CM

## 2018-12-06 PROCEDURE — 92012 INTRM OPH EXAM EST PATIENT: CPT | Mod: S$GLB,,, | Performed by: OPTOMETRIST

## 2018-12-06 PROCEDURE — 99999 PR PBB SHADOW E&M-EST. PATIENT-LVL II: CPT | Mod: PBBFAC,,, | Performed by: OPTOMETRIST

## 2018-12-06 PROCEDURE — 92015 DETERMINE REFRACTIVE STATE: CPT | Mod: S$GLB,,, | Performed by: OPTOMETRIST

## 2018-12-06 NOTE — PROGRESS NOTES
"HPI     LDE: 11/29/2018 w/ Dr. Marty Hewitt  53yr old female present for Refraction per Dr. Hewitt. Patient prefer OTC   readers for small print vs using her bifocal glasses. Patient says she's   "concern" about her distance vision OU, poor vision with current pair of   glasses. Patient feels her glasses need to be updated, to "see details of   people faces" at distance.  Patient have constant headaches, but not sure   if eye related. Pt seeing floaters and flashes, photophobia and eye pain   when Ocular Migraines occur.     Last edited by Dennis Nance MA on 12/6/2018 10:59 AM. (History)            Assessment /Plan     For exam results, see Encounter Report.    Hyperopia, bilateral  Presbyopia   Rx specs, pt prefer separate distance and reading pairs   She spends hours on the computer looking at spreadsheets and chart   Discussed computer/ reading bifocal to help give proper Rx for computer and reading distance     Intracranial meningioma  Central scotoma, both eyes  Secondary optic atrophy of both eyes   Return to Dr. Hewitt as scheduled                 "

## 2018-12-06 NOTE — LETTER
December 6, 2018      Marty Hewitt MD  [address]  [facility] [address]           [facility]  [address]  [facility] [address]  Phone: [phone]  Fax: [phone]          Patient: Patty Owens   MR Number: [id number]   YOB: 1965   Date of Visit: 12/6/2018       Dear Dr. Marty Hewitt:    Thank you for referring Patty Owens to me for evaluation. Attached you will find relevant portions of my assessment and plan of care.    If you have questions, please do not hesitate to call me. I look forward to following Patty Owens along with you.    Sincerely,    Milka Graham, OD    Enclosure  CC:  No Recipients    If you would like to receive this communication electronically, please contact [email or web address] or [phone] to request more information on Contractually Link access.    For providers and/or their staff who would like to refer a patient to [facility], please contact us through our one-stop-shop provider referral line, [facility] , at [phone].    If you feel you have received this communication in error or would no longer like to receive these types of communications, please e-mail [email or web address]

## 2019-01-22 ENCOUNTER — TELEPHONE (OUTPATIENT)
Dept: NEUROSURGERY | Facility: CLINIC | Age: 54
End: 2019-01-22

## 2019-01-22 DIAGNOSIS — D32.0 INTRACRANIAL MENINGIOMA: ICD-10-CM

## 2019-01-22 DIAGNOSIS — H53.413 CENTRAL SCOTOMA, BOTH EYES: Primary | ICD-10-CM

## 2019-02-01 ENCOUNTER — TELEPHONE (OUTPATIENT)
Dept: PHARMACY | Facility: CLINIC | Age: 54
End: 2019-02-01

## 2019-02-08 NOTE — TELEPHONE ENCOUNTER
DOCUMENTATION ONLY  The prior authorization request for Botox was denied by the patient's insurance under the Pharmacy Benefit.     It may be covered under the Medical Benefit with a Prior Authorization.  NATAN

## 2019-02-12 ENCOUNTER — OFFICE VISIT (OUTPATIENT)
Dept: NEUROSURGERY | Facility: CLINIC | Age: 54
End: 2019-02-12
Payer: COMMERCIAL

## 2019-02-12 ENCOUNTER — HOSPITAL ENCOUNTER (OUTPATIENT)
Dept: RADIOLOGY | Facility: HOSPITAL | Age: 54
Discharge: HOME | End: 2019-02-12
Attending: NEUROLOGICAL SURGERY
Payer: COMMERCIAL

## 2019-02-12 ENCOUNTER — TELEPHONE (OUTPATIENT)
Dept: NEUROSURGERY | Facility: CLINIC | Age: 54
End: 2019-02-12

## 2019-02-12 VITALS
HEIGHT: 63 IN | DIASTOLIC BLOOD PRESSURE: 90 MMHG | WEIGHT: 147 LBS | SYSTOLIC BLOOD PRESSURE: 145 MMHG | BODY MASS INDEX: 26.05 KG/M2 | HEART RATE: 78 BPM | TEMPERATURE: 97 F

## 2019-02-12 DIAGNOSIS — D32.0 INTRACRANIAL MENINGIOMA: Primary | ICD-10-CM

## 2019-02-12 DIAGNOSIS — D32.0 INTRACRANIAL MENINGIOMA: ICD-10-CM

## 2019-02-12 PROCEDURE — 70553 MRI BRAIN STEM W/O & W/DYE: CPT | Mod: 26,,, | Performed by: RADIOLOGY

## 2019-02-12 PROCEDURE — A9585 GADOBUTROL INJECTION: HCPCS | Performed by: NEUROLOGICAL SURGERY

## 2019-02-12 PROCEDURE — 99999 PR PBB SHADOW E&M-EST. PATIENT-LVL III: ICD-10-PCS | Mod: PBBFAC,,, | Performed by: NEUROLOGICAL SURGERY

## 2019-02-12 PROCEDURE — 99214 PR OFFICE/OUTPT VISIT, EST, LEVL IV, 30-39 MIN: ICD-10-PCS | Mod: S$GLB,,, | Performed by: NEUROLOGICAL SURGERY

## 2019-02-12 PROCEDURE — 99999 PR PBB SHADOW E&M-EST. PATIENT-LVL III: CPT | Mod: PBBFAC,,, | Performed by: NEUROLOGICAL SURGERY

## 2019-02-12 PROCEDURE — 25500020 PHARM REV CODE 255: Performed by: NEUROLOGICAL SURGERY

## 2019-02-12 PROCEDURE — 70553 MRI BRAIN W WO CONTRAST: ICD-10-PCS | Mod: 26,,, | Performed by: RADIOLOGY

## 2019-02-12 PROCEDURE — 70553 MRI BRAIN STEM W/O & W/DYE: CPT | Mod: TC

## 2019-02-12 PROCEDURE — 3008F BODY MASS INDEX DOCD: CPT | Mod: CPTII,S$GLB,, | Performed by: NEUROLOGICAL SURGERY

## 2019-02-12 PROCEDURE — 3008F PR BODY MASS INDEX (BMI) DOCUMENTED: ICD-10-PCS | Mod: CPTII,S$GLB,, | Performed by: NEUROLOGICAL SURGERY

## 2019-02-12 PROCEDURE — 99214 OFFICE O/P EST MOD 30 MIN: CPT | Mod: S$GLB,,, | Performed by: NEUROLOGICAL SURGERY

## 2019-02-12 RX ORDER — GADOBUTROL 604.72 MG/ML
7 INJECTION INTRAVENOUS
Status: COMPLETED | OUTPATIENT
Start: 2019-02-12 | End: 2019-02-12

## 2019-02-12 RX ORDER — MORPHINE SULFATE 15 MG/1
TABLET, FILM COATED, EXTENDED RELEASE ORAL
COMMUNITY

## 2019-02-12 RX ADMIN — GADOBUTROL 7 ML: 604.72 INJECTION INTRAVENOUS at 08:02

## 2019-02-12 NOTE — PROGRESS NOTES
Subjective:   I, Michael Plata, attest that this documentation has been prepared under the direction and in the presence of Castillo Roberts MD.     Patient ID: Patty Owens is a 53 y.o. female     Chief Complaint: No chief complaint on file.      HPI  MsJoey Owens is a pleasant 53 y.o. woman with a meningioma who presents today for her 1 year follow up with MRI brain. At the time of the last office visit, on 02/27/2018, she complained of constant headaches and visual disturbance. She was taking Tegretol which alleviated her headaches. She also noted feeling anxious whenever she has a headache for which I prescribed Cymbalta.    She states she has continued to have headaches and visual deficits bilaterally, now with progressive memory deficit. She specifies that she has been having difficulty remembering certain tasks and spelling, in addition to difficulty focusing which is affecting her work. She was seen by psychiatry last month, at which time her Vyvanse medication was replaced solely with Ativan. She admits to some improvement with Ativan. She was seen by Dr. Hewitt on 11/29/2018; his note indicates she is showing gradual deterioration of the vision in both eyes with worsening of her visual field defects.         Review of Systems   Constitutional: Negative for activity change, fatigue, fever and unexpected weight change.   HENT: Negative for facial swelling.    Eyes: Positive for visual disturbance.   Respiratory: Negative.    Cardiovascular: Negative.    Gastrointestinal: Negative for diarrhea, nausea and vomiting.   Genitourinary: Negative.    Musculoskeletal: Negative for back pain, joint swelling, myalgias and neck pain.   Neurological: Positive for headaches. Negative for dizziness, weakness and numbness.   Psychiatric/Behavioral: Negative.         + memory deficit and difficulty concentrating.        Past Medical History:   Diagnosis Date    Allergy     Anxiety     Cataract     Fibromyalgia      Hypertension     Meningioma     Migraines        Objective:      Vitals:    02/12/19 0912   BP: (!) 145/90   Pulse: 78   Temp: 97.3 °F (36.3 °C)      Physical Exam   Constitutional: She is oriented to person, place, and time. She appears well-developed and well-nourished.   HENT:   Head: Normocephalic and atraumatic.   Neck: Neck supple.   Neurological: She is alert and oriented to person, place, and time. No cranial nerve deficit. She displays a negative Romberg sign. GCS eye subscore is 4. GCS verbal subscore is 5. GCS motor subscore is 6.          IMAGING:  MRI Brain W WO Contrast (02/12/2019)  Shows remote operative changes of left temporal craniotomy for extra-axial lesion resection with small area of residual enhancement within the left greater wing of the sphenoid with adjacent extra-axial enhancement along the left pterygopalatine fossa, left foramen ovale, left foramen rotundum and infratemporal fossa. There is no mass effect from the residuum.    I have personally reviewed the images with the pt.      I, Dr. Castillo Roberts, personally performed the services described in this documentation. All medical record entries made by the scribe, Michael Plata, were at my direction and in my presence.  I have reviewed the chart and agree that the record reflects my personal performance and is accurate and complete. Castillo Roberts MD. 02/12/2019    Assessment:       1. Intracranial meningioma         Plan:   I have personally reviewed the MRI brain with the pt which shows remote operative changes of left temporal craniotomy for meningioma resection with a small area of residual enhancement within the left greater wing of the sphenoid with adjacent extra-axial enhancement along the left pterygopalatine fossa, left foramen ovale, left foramen rotundum and infratemporal fossa. There is no mass effect from the residuum.    Discussed methods to improve her concentration including: changing the environment in which she does  her work and to work in short intervals.    I will schedule the patient for 1 year follow up with repeat MRI brain. Pt advised to contact us with any questions, concerns, or if she experiences any new or worsening symptoms in the interim.

## 2019-02-12 NOTE — PATIENT INSTRUCTIONS
I have personally reviewed the MRI brain with the pt which shows remote operative changes of left temporal craniotomy for meningioma resection with a small area of residual enhancement within the left greater wing of the sphenoid with adjacent extra-axial enhancement along the left pterygopalatine fossa, left foramen ovale, left foramen rotundum and infratemporal fossa. There is no mass effect from the residuum.    Discussed methods to improve her concentration including: changing the environment in which she does her work and to work in short intervals.    I will schedule the patient for 1 year follow up with repeat MRI brain. Pt advised to contact us with any questions, concerns, or if she experiences any new or worsening symptoms in the interim.

## 2019-04-15 ENCOUNTER — TELEPHONE (OUTPATIENT)
Dept: OPHTHALMOLOGY | Facility: CLINIC | Age: 54
End: 2019-04-15

## 2019-05-29 ENCOUNTER — TELEPHONE (OUTPATIENT)
Dept: PHARMACY | Facility: CLINIC | Age: 54
End: 2019-05-29

## 2019-05-29 NOTE — TELEPHONE ENCOUNTER
Informed Patient  that [facility] received prescription for Botox and benefits investigation is required.  [facility] will be back in touch once insurance determination is received.

## 2019-05-31 NOTE — TELEPHONE ENCOUNTER
Submitted prior authorization request for Botox to both insurance plans ([employer] & [employer]) on 05/31 10:14am. ARR

## 2019-06-04 ENCOUNTER — TELEPHONE (OUTPATIENT)
Dept: PHARMACY | Facility: CLINIC | Age: 54
End: 2019-06-04

## 2019-06-04 NOTE — TELEPHONE ENCOUNTER
Spoke to patient and she confirms ok to ship Botox to [facility] office per agreed date with office. At this time appt is set for 6/7  $0 copay. RPH consult declined.    Patient states that she has had Botox in the past and it worked very well.      Indication: Migraine prophylaxis    Provider called in with following info:  1.     Office address :  [address] [place] [address]    2.     Date needed for shipment: 6/7      Patient verbalized understanding. Patient understands to report any medication changes to [facility] and provider. All questions answered and addressed to patients satisfaction. RPh will touch base with patient in 1 week from start, [facility] to contact patient in 3 months for refills.    Bradly Malone, PharmD  Clinical Pharmacist  [facility]  P: [phone]

## 2019-06-05 NOTE — TELEPHONE ENCOUNTER
DOCUMENTATION ONLY:  Prior Authorization for Botox approved from 05/27/19 to 05/27/2020.    Case Id: [id number]    Co-pay: $250    Forwarded to the clinical pharmacist for consult and shipment.    -ARR

## 2019-08-22 ENCOUNTER — TELEPHONE (OUTPATIENT)
Dept: PHARMACY | Facility: CLINIC | Age: 54
End: 2019-08-22

## 2019-08-22 NOTE — TELEPHONE ENCOUNTER
RX call regarding Botox from [facility]. Patient reached-- gave permission to send medication to Doctors facility for Appointment. Copay of $250.00 charging [employer] card obtained ([phone]) @ [phone]. Name and  confirmed. Patient has 0 doses on hand at this time. Patient has not started any new medications, has had no missed doses and no side effects present. Patient is currently taking the medication as directed by doctors instruction. Patient states they do not have any questions or concerns at this time.     Called [facility] for approval to ship medication to their facility.. Orin CEBALLOS) approved us to ship medication to their facility on  for  arrival to the address of:  [address] [address]  [phone]

## 2019-11-22 ENCOUNTER — TELEPHONE (OUTPATIENT)
Dept: PHARMACY | Facility: CLINIC | Age: 54
End: 2019-11-22

## 2019-11-29 NOTE — TELEPHONE ENCOUNTER
Spoke to patient in regards to Botox refill. Patient authorized deliver of Botox to provider's office. Verified with office to deliver on Thursday 12/5. $11.24 copay at 004.     Marcelo Mendez, PharmD  Clinical Pharmacist  [facility]  P: [phone]

## 2020-03-05 ENCOUNTER — TELEPHONE (OUTPATIENT)
Dept: PHARMACY | Facility: CLINIC | Age: 55
End: 2020-03-05

## 2020-03-19 ENCOUNTER — TELEPHONE (OUTPATIENT)
Dept: PHARMACY | Facility: CLINIC | Age: 55
End: 2020-03-19

## 2020-03-19 NOTE — TELEPHONE ENCOUNTER
Call to assess if patient had been able to receive her Botox. Patient states that her provider is unable to go through buy and bill. She is currently investigating getting Botox through her 's insurance ([employer]) which is her secondary. She does not know what her copay will be doing it this way, so she is unsure if she will be able to afford it. She agreed to a followup call in two weeks were she would have an appointment with her provider and hear back from [employer]. No other questions or concerns.    Bradly Malone, PharmD  Clinical Pharmacist  [facility]  P: [phone]

## 2020-04-21 DIAGNOSIS — Z01.84 ANTIBODY RESPONSE EXAMINATION: ICD-10-CM

## 2020-05-21 DIAGNOSIS — Z01.84 ANTIBODY RESPONSE EXAMINATION: ICD-10-CM

## 2020-06-20 DIAGNOSIS — Z01.84 ANTIBODY RESPONSE EXAMINATION: ICD-10-CM

## 2020-07-20 DIAGNOSIS — Z01.84 ANTIBODY RESPONSE EXAMINATION: ICD-10-CM

## 2020-08-19 DIAGNOSIS — Z01.84 ANTIBODY RESPONSE EXAMINATION: ICD-10-CM

## 2020-09-18 DIAGNOSIS — Z01.84 ANTIBODY RESPONSE EXAMINATION: ICD-10-CM

## 2020-10-18 DIAGNOSIS — Z01.84 ANTIBODY RESPONSE EXAMINATION: ICD-10-CM

## 2020-11-17 DIAGNOSIS — Z01.84 ANTIBODY RESPONSE EXAMINATION: ICD-10-CM

## 2020-12-21 ENCOUNTER — TELEPHONE (OUTPATIENT)
Dept: NEUROSURGERY | Facility: CLINIC | Age: 55
End: 2020-12-21

## 2020-12-21 DIAGNOSIS — D32.0 INTRACRANIAL MENINGIOMA: Primary | ICD-10-CM

## 2020-12-28 ENCOUNTER — IMMUNIZATION (OUTPATIENT)
Dept: INTERNAL MEDICINE | Facility: CLINIC | Age: 55
End: 2020-12-28
Payer: COMMERCIAL

## 2020-12-28 DIAGNOSIS — Z23 NEED FOR VACCINATION: ICD-10-CM

## 2020-12-28 PROCEDURE — 91300 COVID-19, MRNA, LNP-S, PF, 30 MCG/0.3 ML DOSE VACCINE: CPT | Mod: ,,, | Performed by: FAMILY MEDICINE

## 2020-12-28 PROCEDURE — 0001A COVID-19, MRNA, LNP-S, PF, 30 MCG/0.3 ML DOSE VACCINE: ICD-10-PCS | Mod: CV19,,, | Performed by: FAMILY MEDICINE

## 2020-12-28 PROCEDURE — 91300 COVID-19, MRNA, LNP-S, PF, 30 MCG/0.3 ML DOSE VACCINE: ICD-10-PCS | Mod: ,,, | Performed by: FAMILY MEDICINE

## 2020-12-28 PROCEDURE — 0001A COVID-19, MRNA, LNP-S, PF, 30 MCG/0.3 ML DOSE VACCINE: CPT | Mod: CV19,,, | Performed by: FAMILY MEDICINE

## 2021-01-12 ENCOUNTER — OFFICE VISIT (OUTPATIENT)
Dept: NEUROSURGERY | Facility: CLINIC | Age: 56
End: 2021-01-12
Payer: COMMERCIAL

## 2021-01-12 ENCOUNTER — HOSPITAL ENCOUNTER (OUTPATIENT)
Dept: RADIOLOGY | Facility: HOSPITAL | Age: 56
Discharge: HOME | End: 2021-01-12
Attending: NEUROLOGICAL SURGERY
Payer: COMMERCIAL

## 2021-01-12 VITALS
WEIGHT: 132.06 LBS | SYSTOLIC BLOOD PRESSURE: 145 MMHG | DIASTOLIC BLOOD PRESSURE: 90 MMHG | BODY MASS INDEX: 23.39 KG/M2 | HEART RATE: 66 BPM

## 2021-01-12 DIAGNOSIS — D32.0 INTRACRANIAL MENINGIOMA: Primary | ICD-10-CM

## 2021-01-12 DIAGNOSIS — D32.0 INTRACRANIAL MENINGIOMA: ICD-10-CM

## 2021-01-12 PROCEDURE — 3008F BODY MASS INDEX DOCD: CPT | Mod: CPTII,S$GLB,, | Performed by: NEUROLOGICAL SURGERY

## 2021-01-12 PROCEDURE — 1125F AMNT PAIN NOTED PAIN PRSNT: CPT | Mod: S$GLB,,, | Performed by: NEUROLOGICAL SURGERY

## 2021-01-12 PROCEDURE — 70553 MRI BRAIN W WO CONTRAST: ICD-10-PCS | Mod: 26,,, | Performed by: RADIOLOGY

## 2021-01-12 PROCEDURE — 70553 MRI BRAIN STEM W/O & W/DYE: CPT | Mod: 26,,, | Performed by: RADIOLOGY

## 2021-01-12 PROCEDURE — 3008F PR BODY MASS INDEX (BMI) DOCUMENTED: ICD-10-PCS | Mod: CPTII,S$GLB,, | Performed by: NEUROLOGICAL SURGERY

## 2021-01-12 PROCEDURE — 99214 PR OFFICE/OUTPT VISIT, EST, LEVL IV, 30-39 MIN: ICD-10-PCS | Mod: S$GLB,,, | Performed by: NEUROLOGICAL SURGERY

## 2021-01-12 PROCEDURE — 70553 MRI BRAIN STEM W/O & W/DYE: CPT | Mod: TC

## 2021-01-12 PROCEDURE — A9585 GADOBUTROL INJECTION: HCPCS | Performed by: NEUROLOGICAL SURGERY

## 2021-01-12 PROCEDURE — 25500020 PHARM REV CODE 255: Performed by: NEUROLOGICAL SURGERY

## 2021-01-12 PROCEDURE — 99999 PR PBB SHADOW E&M-EST. PATIENT-LVL III: ICD-10-PCS | Mod: PBBFAC,,, | Performed by: NEUROLOGICAL SURGERY

## 2021-01-12 PROCEDURE — 99214 OFFICE O/P EST MOD 30 MIN: CPT | Mod: S$GLB,,, | Performed by: NEUROLOGICAL SURGERY

## 2021-01-12 PROCEDURE — 99999 PR PBB SHADOW E&M-EST. PATIENT-LVL III: CPT | Mod: PBBFAC,,, | Performed by: NEUROLOGICAL SURGERY

## 2021-01-12 PROCEDURE — 1125F PR PAIN SEVERITY QUANTIFIED, PAIN PRESENT: ICD-10-PCS | Mod: S$GLB,,, | Performed by: NEUROLOGICAL SURGERY

## 2021-01-12 RX ORDER — DEXTROAMPHETAMINE SACCHARATE, AMPHETAMINE ASPARTATE, DEXTROAMPHETAMINE SULFATE AND AMPHETAMINE SULFATE 7.5; 7.5; 7.5; 7.5 MG/1; MG/1; MG/1; MG/1
30 TABLET ORAL 3 TIMES DAILY
COMMUNITY

## 2021-01-12 RX ORDER — RIMEGEPANT SULFATE 75 MG/75MG
75 TABLET, ORALLY DISINTEGRATING ORAL DAILY PRN
COMMUNITY

## 2021-01-12 RX ORDER — GADOBUTROL 604.72 MG/ML
7 INJECTION INTRAVENOUS
Status: COMPLETED | OUTPATIENT
Start: 2021-01-12 | End: 2021-01-12

## 2021-01-12 RX ORDER — ONDANSETRON 8 MG/1
8 TABLET, ORALLY DISINTEGRATING ORAL 2 TIMES DAILY
COMMUNITY

## 2021-01-12 RX ORDER — MULTIVITAMIN
1 TABLET ORAL DAILY
COMMUNITY

## 2021-01-12 RX ADMIN — GADOBUTROL 7 ML: 604.72 INJECTION INTRAVENOUS at 08:01

## 2021-01-19 ENCOUNTER — IMMUNIZATION (OUTPATIENT)
Dept: INTERNAL MEDICINE | Facility: CLINIC | Age: 56
End: 2021-01-19
Payer: COMMERCIAL

## 2021-01-19 DIAGNOSIS — Z23 NEED FOR VACCINATION: Primary | ICD-10-CM

## 2021-01-19 PROCEDURE — 91300 COVID-19, MRNA, LNP-S, PF, 30 MCG/0.3 ML DOSE VACCINE: CPT | Mod: PBBFAC | Performed by: FAMILY MEDICINE

## 2021-01-19 PROCEDURE — 0002A COVID-19, MRNA, LNP-S, PF, 30 MCG/0.3 ML DOSE VACCINE: CPT | Mod: PBBFAC | Performed by: FAMILY MEDICINE

## 2021-07-03 ENCOUNTER — SPECIALTY PHARMACY (OUTPATIENT)
Dept: PHARMACY | Facility: CLINIC | Age: 56
End: 2021-07-03

## 2021-07-08 ENCOUNTER — PATIENT MESSAGE (OUTPATIENT)
Dept: PHARMACY | Facility: CLINIC | Age: 56
End: 2021-07-08

## 2021-07-09 ENCOUNTER — SPECIALTY PHARMACY (OUTPATIENT)
Dept: PHARMACY | Facility: CLINIC | Age: 56
End: 2021-07-09

## 2021-09-03 ENCOUNTER — PATIENT MESSAGE (OUTPATIENT)
Dept: NEUROSURGERY | Facility: CLINIC | Age: 56
End: 2021-09-03

## 2021-10-05 ENCOUNTER — SPECIALTY PHARMACY (OUTPATIENT)
Dept: PHARMACY | Facility: CLINIC | Age: 56
End: 2021-10-05

## 2021-10-12 ENCOUNTER — SPECIALTY PHARMACY (OUTPATIENT)
Dept: PHARMACY | Facility: CLINIC | Age: 56
End: 2021-10-12

## 2022-01-10 ENCOUNTER — SPECIALTY PHARMACY (OUTPATIENT)
Dept: PHARMACY | Facility: CLINIC | Age: 57
End: 2022-01-10
Payer: COMMERCIAL

## 2022-01-10 NOTE — TELEPHONE ENCOUNTER
Specialty Pharmacy - Refill Coordination    Specialty Medication Orders Linked to Encounter    Flowsheet Row Most Recent Value   Medication #1 onabotulinumtoxina (BOTOX) 200 unit SolR (Order#[id number], Rx#[id number])          Refill Questions - Documented Responses    Flowsheet Row Most Recent Value   Refill Screening Questions    Changes to allergies? No   Changes to medications? No   New conditions since last clinic visit? No   Unplanned office visit, urgent care, ED, or hospital admission in the last 4 weeks? No   How does patient/caregiver feel medication is working? Good   Financial problems or insurance changes? No   How many doses of your specialty medications were missed in the last 4 weeks? 0   Would patient like to speak to a pharmacist? No   When does the patient need to receive the medication? 01/14/22   Refill Delivery Questions    How will the patient receive the medication?    When does the patient need to receive the medication? 01/14/22   Shipping Address Prescription   Address in [facility] confirmed and updated if neccessary? Yes   Expected Copay ($) 0   Is the patient able to afford the medication copay? Yes   Payment Method zero copay   Days supply of Refill 90   Supplies needed? No supplies needed   Refill activity completed? Yes   Refill activity plan Refill scheduled   Shipment/Pickup Date: 01/13/22          Current Outpatient Medications   Medication Sig    butalbital-acetaminop-caf-cod -21-30 mg Cap butalbital 50 mg-acetaminophen 325 mg-caffeine 40 mg-codeine 30 mg cap    butalbitaL-acetaminop-caf-cod -67-30 mg Cap Take 1 capsule 5 times a day by oral route as needed for 30 days.    dextroamphetamine-amphetamine (ADDERALL) 30 mg Tab Take 30 mg by mouth 3 (three) times daily.    hydrochlorothiazide (HYDRODIURIL) 25 MG tablet Take 50 mg by mouth once daily.    loratadine/pseudoephedrine (LORATA-DINE D ORAL) Take by mouth every evening.    metoprolol tartrate  (LOPRESSOR) 25 MG tablet Take 100 mg by mouth 2 (two) times daily.    morphine (MS CONTIN) 15 MG 12 hr tablet Currently weaning, expected to be off entirely 2/2021    morphine (MS CONTIN) 30 MG 12 hr tablet Take 1 tablet every day by oral route in the evening for 30 days.    multivitamin (ONE DAILY MULTIVITAMIN) per tablet Take 1 tablet by mouth once daily.    naproxen sodium (ALEVE) 220 mg Cap Aleve 220 mg capsule   Take by oral route.    onabotulinumtoxina (BOTOX) 200 unit SolR Inject 200 unit(s) every 3 months by injection route.    onabotulinumtoxina (BOTOX) 200 unit SolR 200 UNITS TO BE INJECTED Intramuscular EVERY 3 MONTHS AS FOLLOWS: 15 Units IN EACH CERVICAL PARASPINAL, 15 Units IN EACH TRAPEZIUS, 20 Units IN EACH OCCIPITALIS, 25 UNITS IN EACH TEMPORALIS, 5 Units IN EACH , 15 Units IN EACH FRONTALIS, 10 Units IN THE PROCERUS    ondansetron (ZOFRAN ODT) 8 MG TbDL Take 8 mg by mouth 2 (two) times daily.    orphenadrine (NORFLEX) 100 mg tablet Take 100 mg by mouth 2 (two) times daily.    promethazine (PHENERGAN) 50 MG tablet Take 50 mg by mouth every 4 (four) hours.    quetiapine (SEROQUEL) 100 MG Tab Take 200 mg by mouth every evening.    rimegepant (NURTEC) 75 mg odt Take 75 mg by mouth daily as needed.    tizanidine (ZANAFLEX) 4 MG tablet Take 4 mg by mouth.   Last reviewed on 7/9/2021  1:51 PM by Finn Armenta PharmD    Review of patient's allergies indicates:   Allergen Reactions    Darvocet a500 [propoxyphene n-acetaminophen] Other (See Comments)     GI      Tigan [trimethobenzamide] Anaphylaxis     Dystonic reaction    Darvon [propoxyphene]     Topamax [topiramate] Rash    Last reviewed on  1/13/2021 7:17 AM by Castillo Roberts      Tasks added this encounter   No tasks added.   Tasks due within next 3 months   1/14/2022 - Refill Call (Auto Added)     RODOLFO CRAFT PharmD  [facility]  [address] [place] [address]  Phone: [phone]  Fax:  [phone]

## 2022-01-10 NOTE — TELEPHONE ENCOUNTER
Miladys with [facility] called and confirmed  delivery for 1/12. Verified address:     Attn: Kera Baptiste MD  [address] [address]    Hours 8-4:30 pm

## 2022-04-04 ENCOUNTER — SPECIALTY PHARMACY (OUTPATIENT)
Dept: PHARMACY | Facility: CLINIC | Age: 57
End: 2022-04-04
Payer: COMMERCIAL

## 2022-04-04 NOTE — TELEPHONE ENCOUNTER
Specialty Pharmacy - Refill Coordination    Specialty Medication Orders Linked to Encounter    Flowsheet Row Most Recent Value   Medication #1 onabotulinumtoxina (BOTOX) 200 unit SolR (Order#[id number], Rx#[id number])          Refill Questions - Documented Responses    Flowsheet Row Most Recent Value   Patient Availability and HIPAA Verification    Does patient want to proceed with activity? Yes   HIPAA/medical authority confirmed? Yes   Relationship to patient of person spoken to? Self   Refill Screening Questions    Changes to allergies? No   Changes to medications? Yes  [Increased Morphine 30mg BID]   New conditions since last clinic visit? No   Unplanned office visit, urgent care, ED, or hospital admission in the last 4 weeks? No   How does patient/caregiver feel medication is working? Good   Financial problems or insurance changes? No   How many doses of your specialty medications were missed in the last 4 weeks? 0   Would patient like to speak to a pharmacist? No   When does the patient need to receive the medication? 04/08/22   Refill Delivery Questions    How will the patient receive the medication?    When does the patient need to receive the medication? 04/08/22   Shipping Address Prescription   Address in [facility] confirmed and updated if neccessary? Yes   Expected Copay ($) 0   Is the patient able to afford the medication copay? Yes   Payment Method zero copay   Days supply of Refill 90   Supplies needed? No supplies needed   Refill activity completed? Yes   Refill activity plan Refill scheduled   Shipment/Pickup Date: 04/06/22          Current Outpatient Medications   Medication Sig    butalbital-acetaminop-caf-cod -11-30 mg Cap butalbital 50 mg-acetaminophen 325 mg-caffeine 40 mg-codeine 30 mg cap    butalbitaL-acetaminop-caf-cod -83-30 mg Cap Take 1 capsule 5 times a day by oral route as needed for 30 days.    dextroamphetamine-amphetamine (ADDERALL) 30 mg Tab Take 30 mg  by mouth 3 (three) times daily.    hydrochlorothiazide (HYDRODIURIL) 25 MG tablet Take 50 mg by mouth once daily.    loratadine/pseudoephedrine (LORATA-DINE D ORAL) Take by mouth every evening.    metoprolol tartrate (LOPRESSOR) 25 MG tablet Take 100 mg by mouth 2 (two) times daily.    morphine (MS CONTIN) 15 MG 12 hr tablet Currently weaning, expected to be off entirely 2/2021    morphine (MS CONTIN) 30 MG 12 hr tablet Take 1 tablet every day by oral route in the evening for 30 days.    multivitamin (ONE DAILY MULTIVITAMIN) per tablet Take 1 tablet by mouth once daily.    naproxen sodium (ALEVE) 220 mg Cap Aleve 220 mg capsule   Take by oral route.    onabotulinumtoxina (BOTOX) 200 unit SolR Inject 200 unit(s) every 3 months by injection route.    onabotulinumtoxina (BOTOX) 200 unit SolR 200 UNITS TO BE INJECTED Intramuscular EVERY 3 MONTHS AS FOLLOWS: 15 Units IN EACH CERVICAL PARASPINAL, 15 Units IN EACH TRAPEZIUS, 20 Units IN EACH OCCIPITALIS, 25 UNITS IN EACH TEMPORALIS, 5 Units IN EACH , 15 Units IN EACH FRONTALIS, 10 Units IN THE PROCERUS    ondansetron (ZOFRAN ODT) 8 MG TbDL Take 8 mg by mouth 2 (two) times daily.    orphenadrine (NORFLEX) 100 mg tablet Take 100 mg by mouth 2 (two) times daily.    promethazine (PHENERGAN) 50 MG tablet Take 50 mg by mouth every 4 (four) hours.    quetiapine (SEROQUEL) 100 MG Tab Take 200 mg by mouth every evening.    rimegepant (NURTEC) 75 mg odt Take 75 mg by mouth daily as needed.    tizanidine (ZANAFLEX) 4 MG tablet Take 4 mg by mouth.   Last reviewed on 7/9/2021  1:51 PM by Finn Armenta, PharmD    Review of patient's allergies indicates:   Allergen Reactions    Darvocet a500 [propoxyphene n-acetaminophen] Other (See Comments)     GI      Tigan [trimethobenzamide] Anaphylaxis     Dystonic reaction    Darvon [propoxyphene]     Topamax [topiramate] Rash    Last reviewed on  1/13/2021 7:17 AM by Castillo Roberts      Tasks added this encounter    6/26/2022 - Refill Call (Auto Added)   Tasks due within next 3 months   No tasks due.     Kamilla Sheehan, PharmD  [facility] - [facility]  [facility] [facility] [address]  Phone: [phone]  Fax: [phone]

## 2022-04-18 ENCOUNTER — PATIENT MESSAGE (OUTPATIENT)
Dept: ADMINISTRATIVE | Facility: OTHER | Age: 57
End: 2022-04-18
Payer: COMMERCIAL

## 2022-06-28 ENCOUNTER — SPECIALTY PHARMACY (OUTPATIENT)
Dept: PHARMACY | Facility: CLINIC | Age: 57
End: 2022-06-28
Payer: COMMERCIAL

## 2022-06-28 NOTE — TELEPHONE ENCOUNTER
Received RX for refill on BOTOX. Per test claim, PA is required. However [facility] faxed over PA approval letter for BOTOX from 07/06/2021 to 07/05/2022 for 12 refills    Called and spoke to Oxana at [facility] Help Desk. She states PA is approved for 12 months for a total of 4 refills and patient has used all 4 refills. New PA is required    Called and spoke to with [facility] regarding need of PA for BOTOX. Office will complete PA as [facility] does not have success in getting BOTOX approved through pharmacy benefit as her pharmacy benefits does not cover provider administered medications. Request for PA is being forwarded over to the provider to complete PA    Informed patient of the above. She is unsure of her appt dates but thinks it is around July 30th.[facility] will call with updates

## 2022-07-05 NOTE — TELEPHONE ENCOUNTER
PA completed by providers office and approved    PA approved from 07/01/2022 to 06/30/2023    PA #[id number]     Copay $0 with BOTOX copay card    LVM to assess when appt for BOTOX is schedule for

## 2022-07-08 NOTE — TELEPHONE ENCOUNTER
Incoming call from Renetta BLACK to make sure everything went through with Botox. Confirmed that [facility] is able to run throguh for $0- just trying to get in touch with patient to confirm delivery.  Patient's appt scheduled for 7/22. Renetta states that [facility] can send to office Tuesday-Thursday. Asked to let them know once delivery is set up: call [phone].

## 2022-07-12 NOTE — TELEPHONE ENCOUNTER
[email or web address]    Patient's Botox copay card . She will call back with updated BOTOX copay card.

## 2022-07-12 NOTE — TELEPHONE ENCOUNTER
Specialty Pharmacy - Medication/Referral Authorization  Specialty Pharmacy - Refill Coordination    Specialty Medication Orders Linked to Encounter    Flowsheet Row Most Recent Value   Medication #1 onabotulinumtoxina (BOTOX) 200 unit SolR (Order#[id number], Rx#[id number])        Confirmed  with Henry at [facility] for 7/14  Attn: Kera Baptiste MD  [address] [facility]  [address]    Refill Questions - Documented Responses    Flowsheet Row Most Recent Value   Patient Availability and HIPAA Verification    Does patient want to proceed with activity? Yes   HIPAA/medical authority confirmed? Yes   Relationship to patient of person spoken to? Self   Refill Screening Questions    Changes to allergies? No   Changes to medications? No   New conditions since last clinic visit? No   Unplanned office visit, urgent care, ED, or hospital admission in the last 4 weeks? No   How does patient/caregiver feel medication is working? Good   Financial problems or insurance changes? No   How many doses of your specialty medications were missed in the last 4 weeks? 0   Would patient like to speak to a pharmacist? No   When does the patient need to receive the medication? 07/22/22   Refill Delivery Questions    How will the patient receive the medication?    When does the patient need to receive the medication? 07/22/22   Shipping Address Prescription   Address in [facility] confirmed and updated if neccessary? Yes   Expected Copay ($) 0   Is the patient able to afford the medication copay? Yes   Payment Method zero copay   Days supply of Refill 90   Supplies needed? No supplies needed   Refill activity completed? Yes   Refill activity plan Refill scheduled   Shipment/Pickup Date: 07/14/22          Current Outpatient Medications   Medication Sig    butalbital-acetaminop-caf-cod -80-30 mg Cap butalbital 50 mg-acetaminophen 325 mg-caffeine 40 mg-codeine 30 mg cap    butalbitaL-acetaminop-caf-cod -70-30 mg  Cap Take 1 capsule 5 times a day by oral route as needed for 30 days.    dextroamphetamine-amphetamine (ADDERALL) 30 mg Tab Take 30 mg by mouth 3 (three) times daily.    hydrochlorothiazide (HYDRODIURIL) 25 MG tablet Take 50 mg by mouth once daily.    loratadine/pseudoephedrine (LORATA-DINE D ORAL) Take by mouth every evening.    metoprolol tartrate (LOPRESSOR) 25 MG tablet Take 100 mg by mouth 2 (two) times daily.    morphine (MS CONTIN) 15 MG 12 hr tablet Currently weaning, expected to be off entirely 2/2021    morphine (MS CONTIN) 30 MG 12 hr tablet Take 1 tablet every day by oral route in the evening for 30 days.    multivitamin (ONE DAILY MULTIVITAMIN) per tablet Take 1 tablet by mouth once daily.    naproxen sodium (ALEVE) 220 mg Cap Aleve 220 mg capsule   Take by oral route.    onabotulinumtoxina (BOTOX) 200 unit SolR Inject 200 unit(s) every 3 months by injection route.    onabotulinumtoxina (BOTOX) 200 unit SolR 200 UNITS TO BE INJECTED Intramuscular EVERY 3 MONTHS AS FOLLOWS: 15 Units IN EACH CERVICAL PARASPINAL, 15 Units IN EACH TRAPEZIUS, 20 Units IN EACH OCCIPITALIS, 25 UNITS IN EACH TEMPORALIS, 5 Units IN EACH , 15 Units IN EACH FRONTALIS, 10 Units IN THE PROCERUS    onabotulinumtoxina (BOTOX) 200 unit SolR 200 UNITS TO BE INJECTED Intramuscular EVERY 3 MONTHS AS FOLLOWS: 15 Units IN EACH CERVICAL PARASPINAL, 15 Units IN EACH TRAPEZIUS, 20 Units IN EACH OCCIPITALIS, 25 UNITS IN EACH TEMPORALIS, 5 Units IN EACH , 15 Units IN EACH FRONTALIS, 10 Units IN THE PROCERUS    ondansetron (ZOFRAN ODT) 8 MG TbDL Take 8 mg by mouth 2 (two) times daily.    orphenadrine (NORFLEX) 100 mg tablet Take 100 mg by mouth 2 (two) times daily.    promethazine (PHENERGAN) 50 MG tablet Take 50 mg by mouth every 4 (four) hours.    quetiapine (SEROQUEL) 100 MG Tab Take 200 mg by mouth every evening.    rimegepant (NURTEC) 75 mg odt Take 75 mg by mouth daily as needed.    tizanidine (ZANAFLEX)  4 MG tablet Take 4 mg by mouth.   Last reviewed on 7/9/2021  1:51 PM by Finn Armenta, PharmANUM    Review of patient's allergies indicates:   Allergen Reactions    Darvocet a500 [propoxyphene n-acetaminophen] Other (See Comments)     GI      Tigan [trimethobenzamide] Anaphylaxis     Dystonic reaction    Darvon [propoxyphene]     Topamax [topiramate] Rash    Last reviewed on  1/13/2021 7:17 AM by Castillo Roberts      Tasks added this encounter   10/13/2022 - Refill Call (Auto Added)   Tasks due within next 3 months   No tasks due.     Finn Armenta, PharmD  [facility] - [facility] Pharm[facility]  [address] [facility] [address]  Phone: [phone]  Fax: [phone]

## 2022-07-12 NOTE — TELEPHONE ENCOUNTER
Patient's new BOTOX ID: [id number] - Added to [facility]    Patient will call back to set up refill for BOTOX

## 2022-10-17 ENCOUNTER — SPECIALTY PHARMACY (OUTPATIENT)
Dept: PHARMACY | Facility: CLINIC | Age: 57
End: 2022-10-17
Payer: COMMERCIAL

## 2022-10-17 NOTE — TELEPHONE ENCOUNTER
Confirmed  with Ronn from [facility] for 10/19/2022 delivery to below address via telephone.  Attn: Kera Baptiste MD  [address] [facility]  [address] [place], [place] [id number]

## 2022-10-17 NOTE — TELEPHONE ENCOUNTER
Specialty Pharmacy - Refill Coordination    Specialty Medication Orders Linked to Encounter      Flowsheet Row Most Recent Value   Medication #1 onabotulinumtoxina (BOTOX) 200 unit SolR (Order#[id number], Rx#[id number])            Refill Questions - Documented Responses      Flowsheet Row Most Recent Value   Patient Availability and HIPAA Verification    Does patient want to proceed with activity? Yes   HIPAA/medical authority confirmed? Yes   Relationship to patient of person spoken to? Self   Refill Screening Questions    Changes to allergies? No   Changes to medications? No   New conditions since last clinic visit? No   Unplanned office visit, urgent care, ED, or hospital admission in the last 4 weeks? No   How does patient/caregiver feel medication is working? Very good   Financial problems or insurance changes? No   How many doses of your specialty medications were missed in the last 4 weeks? 0   Would patient like to speak to a pharmacist? No   When does the patient need to receive the medication? 10/21/22   Refill Delivery Questions    How will the patient receive the medication?    When does the patient need to receive the medication? 10/21/22   Shipping Address Prescription   Address in [facility] confirmed and updated if neccessary? Yes   Expected Copay ($) 0   Is the patient able to afford the medication copay? Yes   Payment Method zero copay   Days supply of Refill 84   Supplies needed? No supplies needed   Refill activity completed? Yes   Refill activity plan Refill scheduled   Shipment/Pickup Date: 10/19/22            Current Outpatient Medications   Medication Sig    butalbital-acetaminop-caf-cod -73-30 mg Cap butalbital 50 mg-acetaminophen 325 mg-caffeine 40 mg-codeine 30 mg cap    butalbitaL-acetaminop-caf-cod -78-30 mg Cap Take 1 capsule 5 times a day by oral route as needed for 30 days.    dextroamphetamine-amphetamine (ADDERALL) 30 mg Tab Take 30 mg by mouth 3 (three)  times daily.    hydrochlorothiazide (HYDRODIURIL) 25 MG tablet Take 50 mg by mouth once daily.    loratadine/pseudoephedrine (LORATA-DINE D ORAL) Take by mouth every evening.    metoprolol tartrate (LOPRESSOR) 25 MG tablet Take 100 mg by mouth 2 (two) times daily.    morphine (MS CONTIN) 15 MG 12 hr tablet Currently weaning, expected to be off entirely 2/2021    morphine (MS CONTIN) 30 MG 12 hr tablet Take 1 tablet every day by oral route in the evening for 30 days.    multivitamin (ONE DAILY MULTIVITAMIN) per tablet Take 1 tablet by mouth once daily.    naproxen sodium (ALEVE) 220 mg Cap Aleve 220 mg capsule   Take by oral route.    onabotulinumtoxina (BOTOX) 200 unit SolR Inject 200 unit(s) every 3 months by injection route.    onabotulinumtoxina (BOTOX) 200 unit SolR 200 UNITS TO BE INJECTED Intramuscular EVERY 3 MONTHS AS FOLLOWS: 15 Units IN EACH CERVICAL PARASPINAL, 15 Units IN EACH TRAPEZIUS, 20 Units IN EACH OCCIPITALIS, 25 UNITS IN EACH TEMPORALIS, 5 Units IN EACH , 15 Units IN EACH FRONTALIS, 10 Units IN THE PROCERUS    onabotulinumtoxina (BOTOX) 200 unit SolR 200 UNITS TO BE INJECTED Intramuscular EVERY 3 MONTHS AS FOLLOWS: 15 Units IN EACH CERVICAL PARASPINAL, 15 Units IN EACH TRAPEZIUS, 20 Units IN EACH OCCIPITALIS, 25 UNITS IN EACH TEMPORALIS, 5 Units IN EACH , 15 Units IN EACH FRONTALIS, 10 Units IN THE PROCERUS    ondansetron (ZOFRAN ODT) 8 MG TbDL Take 8 mg by mouth 2 (two) times daily.    orphenadrine (NORFLEX) 100 mg tablet Take 100 mg by mouth 2 (two) times daily.    promethazine (PHENERGAN) 50 MG tablet Take 50 mg by mouth every 4 (four) hours.    quetiapine (SEROQUEL) 100 MG Tab Take 200 mg by mouth every evening.    rimegepant (NURTEC) 75 mg odt Take 75 mg by mouth daily as needed.    tizanidine (ZANAFLEX) 4 MG tablet Take 4 mg by mouth.   Last reviewed on 7/9/2021  1:51 PM by Finn Armenta, PharmANUM    Review of patient's allergies indicates:   Allergen Reactions    Darvocet  a500 [propoxyphene n-acetaminophen] Other (See Comments)     GI      Tigan [trimethobenzamide] Anaphylaxis     Dystonic reaction    Darvon [propoxyphene]     Topamax [topiramate] Rash    Last reviewed on  1/13/2021 7:17 AM by Castillo Roberts      Tasks added this encounter   No tasks added.   Tasks due within next 3 months   10/13/2022 - Refill Call (Auto Added)     Genesis Faria, PharmD  [facility]  [address] [place], [place] [address]  [facility] [address]  Phone: [phone]  Fax: [phone]

## 2023-01-04 ENCOUNTER — SPECIALTY PHARMACY (OUTPATIENT)
Dept: PHARMACY | Facility: CLINIC | Age: 58
End: 2023-01-04
Payer: COMMERCIAL

## 2023-01-04 NOTE — TELEPHONE ENCOUNTER
Incoming call from pt that was transferred to me regarding pt next Botox appt. Next appt is 1/20. Asked pt if we could call to set up refill when it gets closer to injection date and she agreed. Will follow up on 1/10 to set up refill and confirm .

## 2023-01-10 NOTE — TELEPHONE ENCOUNTER
Specialty Pharmacy - Refill Coordination    Specialty Medication Orders Linked to Encounter      Flowsheet Row Most Recent Value   Medication #1 onabotulinumtoxina (BOTOX) 200 unit SolR (Order#[id number], Rx#[id number])            Refill Questions - Documented Responses      Flowsheet Row Most Recent Value   Patient Availability and HIPAA Verification    Does patient want to proceed with activity? Yes   HIPAA/medical authority confirmed? Yes   Relationship to patient of person spoken to? Self   Refill Screening Questions    Changes to allergies? No   Changes to medications? No   New conditions since last clinic visit? No   Unplanned office visit, urgent care, ED, or hospital admission in the last 4 weeks? No   How does patient/caregiver feel medication is working? Good   Financial problems or insurance changes? No   How many doses of your specialty medications were missed in the last 4 weeks? 0   Would patient like to speak to a pharmacist? No   When does the patient need to receive the medication? 01/19/23   Refill Delivery Questions    How will the patient receive the medication?    When does the patient need to receive the medication? 01/19/23   Shipping Address Prescription   Address in [facility] confirmed and updated if neccessary? Yes   Expected Copay ($) 0   Is the patient able to afford the medication copay? Yes   Payment Method zero copay   Days supply of Refill 84   Supplies needed? No supplies needed   Refill activity completed? Yes   Refill activity plan Refill scheduled   Shipment/Pickup Date: 01/18/23            Current Outpatient Medications   Medication Sig    butalbital-acetaminop-caf-cod -53-30 mg Cap butalbital 50 mg-acetaminophen 325 mg-caffeine 40 mg-codeine 30 mg cap    butalbitaL-acetaminop-caf-cod -10-30 mg Cap Take 1 capsule 5 times a day by oral route as needed for 30 days.    dextroamphetamine-amphetamine (ADDERALL) 30 mg Tab Take 30 mg by mouth 3 (three) times  daily.    hydrochlorothiazide (HYDRODIURIL) 25 MG tablet Take 50 mg by mouth once daily.    loratadine/pseudoephedrine (LORATA-DINE D ORAL) Take by mouth every evening.    metoprolol tartrate (LOPRESSOR) 25 MG tablet Take 100 mg by mouth 2 (two) times daily.    morphine (MS CONTIN) 15 MG 12 hr tablet Currently weaning, expected to be off entirely 2/2021    morphine (MS CONTIN) 30 MG 12 hr tablet Take 1 tablet every day by oral route in the evening for 30 days.    multivitamin (ONE DAILY MULTIVITAMIN) per tablet Take 1 tablet by mouth once daily.    naproxen sodium (ALEVE) 220 mg Cap Aleve 220 mg capsule   Take by oral route.    onabotulinumtoxina (BOTOX) 200 unit SolR Inject 200 unit(s) every 3 months by injection route.    onabotulinumtoxina (BOTOX) 200 unit SolR 200 UNITS TO BE INJECTED Intramuscular EVERY 3 MONTHS AS FOLLOWS: 15 Units IN EACH CERVICAL PARASPINAL, 15 Units IN EACH TRAPEZIUS, 20 Units IN EACH OCCIPITALIS, 25 UNITS IN EACH TEMPORALIS, 5 Units IN EACH , 15 Units IN EACH FRONTALIS, 10 Units IN THE PROCERUS    onabotulinumtoxina (BOTOX) 200 unit SolR 200 UNITS TO BE INJECTED Intramuscular EVERY 3 MONTHS AS FOLLOWS: 15 Units IN EACH CERVICAL PARASPINAL, 15 Units IN EACH TRAPEZIUS, 20 Units IN EACH OCCIPITALIS, 25 UNITS IN EACH TEMPORALIS, 5 Units IN EACH , 15 Units IN EACH FRONTALIS, 10 Units IN THE PROCERUS    ondansetron (ZOFRAN ODT) 8 MG TbDL Take 8 mg by mouth 2 (two) times daily.    orphenadrine (NORFLEX) 100 mg tablet Take 100 mg by mouth 2 (two) times daily.    promethazine (PHENERGAN) 50 MG tablet Take 50 mg by mouth every 4 (four) hours.    quetiapine (SEROQUEL) 100 MG Tab Take 200 mg by mouth every evening.    rimegepant (NURTEC) 75 mg odt Take 75 mg by mouth daily as needed.    tizanidine (ZANAFLEX) 4 MG tablet Take 4 mg by mouth.   Last reviewed on 7/9/2021  1:51 PM by Finn Armenta, PharmD    Review of patient's allergies indicates:   Allergen Reactions    Darvocet a500  [propoxyphene n-acetaminophen] Other (See Comments)     GI      Tigan [trimethobenzamide] Anaphylaxis     Dystonic reaction    Darvon [propoxyphene]     Topamax [topiramate] Rash    Last reviewed on  1/13/2021 7:17 AM by Castillo Roberts      Tasks added this encounter   No tasks added.   Tasks due within next 3 months   1/2/2023 - Refill Call (Auto Added)     [facility]  [address] [place], [place] [id number]  [facility] [address]  Phone: [phone]  Fax: [phone]

## 2023-01-20 NOTE — TELEPHONE ENCOUNTER
Henry from [facility] called in asking for Botox delivery. Asked fulfillment team and they have confirmation that it was delivered 1/18 via  and at 11:12 am Roseanna received the package. Henry will speak with Roseanna and to call [facility] if any other concerns arise.

## 2023-04-06 ENCOUNTER — SPECIALTY PHARMACY (OUTPATIENT)
Dept: PHARMACY | Facility: CLINIC | Age: 58
End: 2023-04-06
Payer: COMMERCIAL

## 2023-04-06 NOTE — TELEPHONE ENCOUNTER
Outgoing call regarding Botox refill, pt stated her appointment is 4/21, informed pt it too soon. Informed pt will follow up on 4/12 to schedule refill and delivery.

## 2023-04-12 NOTE — TELEPHONE ENCOUNTER
Confirmed  with Ronn from [facility] for 04/18/2023 delivery to below address via telephone.    Attn: Kera Baptiste MD  [address] [facility]  [address] [place] [address]

## 2023-04-12 NOTE — TELEPHONE ENCOUNTER
Specialty Pharmacy - Refill Coordination    Specialty Medication Orders Linked to Encounter      Flowsheet Row Most Recent Value   Medication #1 onabotulinumtoxina (BOTOX) 200 unit SolR (Order#[id number], Rx#[id number])            Refill Questions - Documented Responses      Flowsheet Row Most Recent Value   Patient Availability and HIPAA Verification    Does patient want to proceed with activity? Yes   HIPAA/medical authority confirmed? Yes   Relationship to patient of person spoken to? Self   Refill Screening Questions    Changes to allergies? No   Changes to medications? No   New conditions since last clinic visit? No   Unplanned office visit, urgent care, ED, or hospital admission in the last 4 weeks? No   How does patient/caregiver feel medication is working? Good   Financial problems or insurance changes? No   How many doses of your specialty medications were missed in the last 4 weeks? 0   Would patient like to speak to a pharmacist? No   When does the patient need to receive the medication? 04/21/23   Refill Delivery Questions    How will the patient receive the medication?    When does the patient need to receive the medication? 04/21/23   Shipping Address Prescription   Address in [facility] confirmed and updated if neccessary? Yes   Expected Copay ($) 0   Is the patient able to afford the medication copay? Yes   Payment Method zero copay   Days supply of Refill 84   Supplies needed? No supplies needed   Refill activity completed? Yes   Refill activity plan Refill scheduled   Shipment/Pickup Date: 04/18/23            Current Outpatient Medications   Medication Sig    butalbital-acetaminop-caf-cod -89-30 mg Cap butalbital 50 mg-acetaminophen 325 mg-caffeine 40 mg-codeine 30 mg cap    butalbitaL-acetaminop-caf-cod -37-30 mg Cap Take 1 capsule 5 times a day by oral route as needed for 30 days.    dextroamphetamine-amphetamine (ADDERALL) 30 mg Tab Take 30 mg by mouth 3 (three) times  daily.    hydrochlorothiazide (HYDRODIURIL) 25 MG tablet Take 50 mg by mouth once daily.    loratadine/pseudoephedrine (LORATA-DINE D ORAL) Take by mouth every evening.    metoprolol tartrate (LOPRESSOR) 25 MG tablet Take 100 mg by mouth 2 (two) times daily.    morphine (MS CONTIN) 15 MG 12 hr tablet Currently weaning, expected to be off entirely 2/2021    morphine (MS CONTIN) 30 MG 12 hr tablet Take 1 tablet every day by oral route in the evening for 30 days.    multivitamin (ONE DAILY MULTIVITAMIN) per tablet Take 1 tablet by mouth once daily.    naproxen sodium (ALEVE) 220 mg Cap Aleve 220 mg capsule   Take by oral route.    onabotulinumtoxina (BOTOX) 200 unit SolR Inject 200 unit(s) every 3 months by injection route.    onabotulinumtoxina (BOTOX) 200 unit SolR 200 UNITS TO BE INJECTED Intramuscular EVERY 3 MONTHS AS FOLLOWS: 15 Units IN EACH CERVICAL PARASPINAL, 15 Units IN EACH TRAPEZIUS, 20 Units IN EACH OCCIPITALIS, 25 UNITS IN EACH TEMPORALIS, 5 Units IN EACH , 15 Units IN EACH FRONTALIS, 10 Units IN THE PROCERUS    onabotulinumtoxina (BOTOX) 200 unit SolR 200 UNITS TO BE INJECTED Intramuscular EVERY 3 MONTHS AS FOLLOWS: 15 Units IN EACH CERVICAL PARASPINAL, 15 Units IN EACH TRAPEZIUS, 20 Units IN EACH OCCIPITALIS, 25 UNITS IN EACH TEMPORALIS, 5 Units IN EACH , 15 Units IN EACH FRONTALIS, 10 Units IN THE PROCERUS    ondansetron (ZOFRAN ODT) 8 MG TbDL Take 8 mg by mouth 2 (two) times daily.    orphenadrine (NORFLEX) 100 mg tablet Take 100 mg by mouth 2 (two) times daily.    promethazine (PHENERGAN) 50 MG tablet Take 50 mg by mouth every 4 (four) hours.    quetiapine (SEROQUEL) 100 MG Tab Take 200 mg by mouth every evening.    rimegepant (NURTEC) 75 mg odt Take 75 mg by mouth daily as needed.    tizanidine (ZANAFLEX) 4 MG tablet Take 4 mg by mouth.   Last reviewed on 7/9/2021  1:51 PM by Finn Armenta, PharmD    Review of patient's allergies indicates:   Allergen Reactions    Darvocet a500  [propoxyphene n-acetaminophen] Other (See Comments)     GI      Tigan [trimethobenzamide] Anaphylaxis     Dystonic reaction    Darvon [propoxyphene]     Topamax [topiramate] Rash    Last reviewed on  1/13/2021 7:17 AM by Castillo Roberts      Tasks added this encounter   7/4/2023 - Refill Call (Auto Added)  4/12/2023 -  Setup Confirmation   Tasks due within next 3 months   No tasks due.     Genesis Faria, PharmD  [facility]  [address] [place] [address]  Phone: [phone]  Fax: [phone]

## 2023-04-21 NOTE — TELEPHONE ENCOUNTER
Incoming call from Ronn with [facility] inquiring about Botox delivery. Informed her [facility] delivered the medication on 4/18 and it was signed for by Geena at the office. No further questions/concerns.

## 2023-07-05 ENCOUNTER — SPECIALTY PHARMACY (OUTPATIENT)
Dept: PHARMACY | Facility: CLINIC | Age: 58
End: 2023-07-05
Payer: COMMERCIAL

## 2023-07-05 NOTE — TELEPHONE ENCOUNTER
Outgoing call regarding botox refill; per pt, she's due to inject on 7/21; informed her that a refill request was sent to md, and once approved [facility] will follow up to schedule delivery

## 2023-07-10 NOTE — TELEPHONE ENCOUNTER
Outgoing call regarding pt Botox. Pt next injection is 7/21. Too soon to set up the refill, will follow up with her to set that up.

## 2023-07-11 NOTE — TELEPHONE ENCOUNTER
Received fax from provider office regarding Botox PA approval. Test claim: product/service not covered. Outgoing call to pharmacy help desk, representative Nicanor PIMENTEL stated she emailed the PA department regarding rejection and recommended to re-process the claim tomorrow. Will follow up.

## 2023-07-13 NOTE — TELEPHONE ENCOUNTER
Spoke with patient, informed her the PA was denied. Spoke to insurance, representative stated the PA was denied because Botox is a plan exclusion for this year as it is not self-injectable. Representative faxed the denial letter to the provider office and stated the provider can appeal the denial. Informed patient and provider office. Will follow up pending appeal determination.

## 2023-07-13 NOTE — TELEPHONE ENCOUNTER
Incoming call regarding Botox injection. PT stated that she is due to inject on 7/21/23. Transferred to [facility]

## 2024-09-30 ENCOUNTER — IMMUNIZATION (OUTPATIENT)
Dept: INTERNAL MEDICINE | Facility: CLINIC | Age: 59
End: 2024-09-30
Payer: COMMERCIAL

## 2024-09-30 DIAGNOSIS — Z23 NEED FOR VACCINATION: Primary | ICD-10-CM

## 2024-09-30 PROCEDURE — 90471 IMMUNIZATION ADMIN: CPT | Mod: S$GLB,,, | Performed by: INTERNAL MEDICINE

## 2024-09-30 PROCEDURE — 90480 ADMN SARSCOV2 VAC 1/ONLY CMP: CPT | Mod: S$GLB,,, | Performed by: INTERNAL MEDICINE

## 2024-09-30 PROCEDURE — 91320 SARSCV2 VAC 30MCG TRS-SUC IM: CPT | Mod: S$GLB,,, | Performed by: INTERNAL MEDICINE

## 2024-09-30 PROCEDURE — 90656 IIV3 VACC NO PRSV 0.5 ML IM: CPT | Mod: S$GLB,,, | Performed by: INTERNAL MEDICINE
